# Patient Record
Sex: FEMALE | Race: WHITE | NOT HISPANIC OR LATINO | Employment: OTHER | URBAN - METROPOLITAN AREA
[De-identification: names, ages, dates, MRNs, and addresses within clinical notes are randomized per-mention and may not be internally consistent; named-entity substitution may affect disease eponyms.]

---

## 2024-02-16 ENCOUNTER — OFFICE VISIT (OUTPATIENT)
Dept: FAMILY MEDICINE | Facility: CLINIC | Age: 74
End: 2024-02-16
Payer: MEDICARE

## 2024-02-16 VITALS
HEART RATE: 71 BPM | SYSTOLIC BLOOD PRESSURE: 130 MMHG | TEMPERATURE: 98 F | OXYGEN SATURATION: 95 % | BODY MASS INDEX: 25.67 KG/M2 | HEIGHT: 63 IN | DIASTOLIC BLOOD PRESSURE: 78 MMHG | WEIGHT: 144.88 LBS

## 2024-02-16 DIAGNOSIS — M79.7 FIBROMYALGIA: ICD-10-CM

## 2024-02-16 DIAGNOSIS — Z11.59 NEED FOR HEPATITIS C SCREENING TEST: ICD-10-CM

## 2024-02-16 DIAGNOSIS — I15.2 HYPERTENSION ASSOCIATED WITH DIABETES: ICD-10-CM

## 2024-02-16 DIAGNOSIS — N32.81 OVERACTIVE BLADDER: ICD-10-CM

## 2024-02-16 DIAGNOSIS — N95.8 OTHER SPECIFIED MENOPAUSAL AND PERIMENOPAUSAL DISORDERS: ICD-10-CM

## 2024-02-16 DIAGNOSIS — Z79.899 HIGH RISK MEDICATION USE: ICD-10-CM

## 2024-02-16 DIAGNOSIS — E11.59 HYPERTENSION ASSOCIATED WITH DIABETES: ICD-10-CM

## 2024-02-16 DIAGNOSIS — Z12.11 COLON CANCER SCREENING: ICD-10-CM

## 2024-02-16 DIAGNOSIS — E11.9 TYPE 2 DIABETES MELLITUS WITHOUT COMPLICATION, WITHOUT LONG-TERM CURRENT USE OF INSULIN: Primary | ICD-10-CM

## 2024-02-16 DIAGNOSIS — Z12.31 ENCOUNTER FOR SCREENING MAMMOGRAM FOR MALIGNANT NEOPLASM OF BREAST: ICD-10-CM

## 2024-02-16 DIAGNOSIS — E11.42 DIABETIC POLYNEUROPATHY ASSOCIATED WITH TYPE 2 DIABETES MELLITUS: ICD-10-CM

## 2024-02-16 PROBLEM — D63.8 ANEMIA OF CHRONIC DISEASE: Status: ACTIVE | Noted: 2024-02-16

## 2024-02-16 PROBLEM — I10 HYPERTENSION: Status: ACTIVE | Noted: 2024-02-16

## 2024-02-16 PROBLEM — E11.40 DIABETIC NEUROPATHY: Status: ACTIVE | Noted: 2024-02-16

## 2024-02-16 PROBLEM — M19.90 ARTHRITIS: Status: ACTIVE | Noted: 2024-02-16

## 2024-02-16 PROBLEM — E11.39: Status: ACTIVE | Noted: 2024-02-16

## 2024-02-16 PROCEDURE — 1159F MED LIST DOCD IN RCRD: CPT | Mod: CPTII,S$GLB,, | Performed by: STUDENT IN AN ORGANIZED HEALTH CARE EDUCATION/TRAINING PROGRAM

## 2024-02-16 PROCEDURE — 3075F SYST BP GE 130 - 139MM HG: CPT | Mod: CPTII,S$GLB,, | Performed by: STUDENT IN AN ORGANIZED HEALTH CARE EDUCATION/TRAINING PROGRAM

## 2024-02-16 PROCEDURE — 1126F AMNT PAIN NOTED NONE PRSNT: CPT | Mod: CPTII,S$GLB,, | Performed by: STUDENT IN AN ORGANIZED HEALTH CARE EDUCATION/TRAINING PROGRAM

## 2024-02-16 PROCEDURE — 3078F DIAST BP <80 MM HG: CPT | Mod: CPTII,S$GLB,, | Performed by: STUDENT IN AN ORGANIZED HEALTH CARE EDUCATION/TRAINING PROGRAM

## 2024-02-16 PROCEDURE — 99204 OFFICE O/P NEW MOD 45 MIN: CPT | Mod: S$GLB,,, | Performed by: STUDENT IN AN ORGANIZED HEALTH CARE EDUCATION/TRAINING PROGRAM

## 2024-02-16 PROCEDURE — 3008F BODY MASS INDEX DOCD: CPT | Mod: CPTII,S$GLB,, | Performed by: STUDENT IN AN ORGANIZED HEALTH CARE EDUCATION/TRAINING PROGRAM

## 2024-02-16 PROCEDURE — 1160F RVW MEDS BY RX/DR IN RCRD: CPT | Mod: CPTII,S$GLB,, | Performed by: STUDENT IN AN ORGANIZED HEALTH CARE EDUCATION/TRAINING PROGRAM

## 2024-02-16 RX ORDER — CYCLOBENZAPRINE HCL 10 MG
10 TABLET ORAL NIGHTLY
Qty: 90 TABLET | Refills: 1 | Status: SHIPPED | OUTPATIENT
Start: 2024-02-16 | End: 2025-02-15

## 2024-02-16 RX ORDER — GABAPENTIN 300 MG/1
300 CAPSULE ORAL 2 TIMES DAILY
Qty: 180 CAPSULE | Refills: 1 | Status: SHIPPED | OUTPATIENT
Start: 2024-02-16 | End: 2025-02-15

## 2024-02-16 RX ORDER — DULAGLUTIDE 0.75 MG/.5ML
INJECTION, SOLUTION SUBCUTANEOUS
COMMUNITY
End: 2024-02-16

## 2024-02-16 RX ORDER — LOSARTAN POTASSIUM AND HYDROCHLOROTHIAZIDE 12.5; 5 MG/1; MG/1
TABLET ORAL
COMMUNITY
Start: 2024-01-10 | End: 2024-02-16 | Stop reason: SDUPTHER

## 2024-02-16 RX ORDER — AMITRIPTYLINE HYDROCHLORIDE 100 MG/1
TABLET ORAL
COMMUNITY
End: 2024-02-16

## 2024-02-16 RX ORDER — OXYBUTYNIN CHLORIDE 5 MG/1
5 TABLET ORAL 2 TIMES DAILY
Qty: 180 TABLET | Refills: 1 | Status: SHIPPED | OUTPATIENT
Start: 2024-02-16 | End: 2025-02-15

## 2024-02-16 RX ORDER — DULOXETIN HYDROCHLORIDE 30 MG/1
30 CAPSULE, DELAYED RELEASE ORAL DAILY
Qty: 90 CAPSULE | Refills: 1 | Status: SHIPPED | OUTPATIENT
Start: 2024-02-16 | End: 2025-02-15

## 2024-02-16 RX ORDER — LOSARTAN POTASSIUM AND HYDROCHLOROTHIAZIDE 12.5; 5 MG/1; MG/1
1 TABLET ORAL DAILY
Qty: 90 TABLET | Refills: 1 | Status: SHIPPED | OUTPATIENT
Start: 2024-02-16 | End: 2025-02-15

## 2024-02-16 RX ORDER — OXYBUTYNIN CHLORIDE 5 MG/1
5 TABLET ORAL 2 TIMES DAILY
COMMUNITY
End: 2024-02-16 | Stop reason: SDUPTHER

## 2024-02-16 RX ORDER — BACLOFEN 10 MG/1
10 TABLET ORAL 2 TIMES DAILY
COMMUNITY
End: 2024-02-16

## 2024-02-16 RX ORDER — GABAPENTIN 300 MG/1
300 CAPSULE ORAL 2 TIMES DAILY
COMMUNITY
End: 2024-02-16 | Stop reason: SDUPTHER

## 2024-02-16 RX ORDER — LANCETS 33 GAUGE
EACH MISCELLANEOUS
COMMUNITY
Start: 2023-08-28

## 2024-02-16 RX ORDER — CYCLOBENZAPRINE HCL 10 MG
10 TABLET ORAL NIGHTLY
COMMUNITY
Start: 2023-09-14 | End: 2024-02-16 | Stop reason: SDUPTHER

## 2024-02-16 NOTE — PATIENT INSTRUCTIONS
Cortez Moise,     If you are due for any health screening(s) below please notify me so we can arrange them to be ordered and scheduled. Most healthy patients at your age complete them, but you are free to accept or refuse.     If you can't do it, I'll definitely understand. If you can, I'd certainly appreciate it!    Tests to Keep You Healthy    Mammogram: DUE  Colon Cancer Screening: DUE      Schedule your breast cancer screening today     Breast cancer is the second most common cancer in women,  and the second leading cause of death from cancer. Mammograms can detect breast cancer early, which significantly increases the chances of curing the cancer.       Our records indicate that you may be overdue for breast cancer screening. Cancer screenings save lives, so schedule yours today to stay healthy.     If you recently had a mammogram performed outside of [facility], please let your Health care team know so that they can update your health record.        Its time for your colon cancer screening     Colorectal cancer is one of the leading causes of cancer death for men and women but it doesnt have to be. Screenings can prevent colorectal cancer or find it early enough to treat and cure the disease.     Our records indicate that you may be overdue for colon cancer screening. A colonoscopy or stool screening test can help identify patients at risk for developing colon cancer. Cancer screenings save lives, so schedule yours today to stay healthy.     A colonoscopy is the preferred test for detecting colon cancer. It is needed only once every 10 years if results are negative. While you are sedated, a flexible, lighted tube with a tiny camera is inserted into the rectum and advanced through the colon to look for cancers.     An alternative screening test that is used at home and returned to the lab may also be used. It detects hidden blood in bowel movements which could indicate cancer in the colon. If  results are positive, you will need a colonoscopy to determine if the blood is a sign of cancer. This type of follow up (diagnostic) colonoscopy usually requires additional copays as required by your insurance provider.     If you recently had your colon cancer screening performed outside of [facility], please let your Health care team know so that they can update your health record. Please contact your PCP if you have any questions.

## 2024-02-16 NOTE — PROGRESS NOTES
"  [facility]  [facility] - [place], [place]    Family Medicine Office Visit    Subjective     Patient ID: Suma Cruz is a 73 y.o. female who presents to clinic today to establish care.     Medical history, surgical history, medications, allergies, and social history were reviewed and updated.     Chief Complaint: Establish Care and Medication Refill    Medication Refill        Establish care  She presents today to establish care. We have reviewed medical history, surgical history, family history, medications, allergies, and social history. EMR was updated appropriately.     Type 2 diabetes mellitus  History of diabetes mellitus.  Was that she has currently not on a medication for this.  Reported that she was previously on metformin, but had diarrhea that led to dehydration in the hospitalization.  This was then discontinued.  She was then started on Trulicity it was unable to afford this medicine.  She reports that she does take her glucose at home and her glucose is usually in the 120s to 130s.  Reported that she largely manages her diabetes through diet.  She is agreeable to obtain lab work for monitoring.    She does report some diabetic neuropathy and currently takes gabapentin 300 mg twice daily.    Diabetes monitoring    No results found for: "HGBA1C"  No results found for: "GLUF", "MICROALBUR", "LDLCALC", "CREATININE"   No results found for this or any previous visit.     is not on statin therapy - Will plan to discuss at follow up  is on ACE/ARB therapy - losartan 50 mg daily     Hypertension  History of hypertension with current medication regimen of losartan-hydrochlorothiazide 50-12.5 mg daily. Most recent blood pressures shown below.  Did not report headaches, changes in vision, chest pain, and shortness of breath.  Blood pressure is currently at goal.  We will plan to continue current regimen.    BP Readings from Last 3 Encounters:   02/16/24 130/78      Overactive bladder  She " reports a history of overactive bladder that is currently well-controlled with the oxybutynin 5 mg twice daily.  Reports she has not seen a urologist in the past for this.  She is requesting refills of this medication.  No new or worsening dysuria, hematuria, urgency, or frequency.    Fibromyalgia  She reported history of fibromyalgia with current management of gabapentin 300 mg twice daily and Cymbalta 30 mg daily.  She is also on Flexeril and baclofen.  Recommended against the use of 2 muscle relaxers.  She was agreeable to start baclofen.  Discussed that as Flexeril is not recommended in geriatric patients, would recommend only taking this medication at night.    Vitals:    02/16/24 1646   BP: 130/78   Pulse: 71   Temp: 98.1 °F (36.7 °C)      Wt Readings from Last 3 Encounters:   02/16/24 1646 65.7 kg (144 lb 14.4 oz)      Review of Systems    Review of Systems otherwise negative unless specified above.        Objective     Physical Exam  Vitals reviewed.   Constitutional:       General: She is not in acute distress.     Appearance: Normal appearance.   HENT:      Head: Normocephalic and atraumatic.      Nose: Nose normal.      Mouth/Throat:      Mouth: Mucous membranes are moist.   Cardiovascular:      Rate and Rhythm: Normal rate and regular rhythm.      Heart sounds: No murmur heard.  Pulmonary:      Effort: Pulmonary effort is normal. No respiratory distress.      Breath sounds: Normal breath sounds.   Musculoskeletal:         General: Normal range of motion.   Skin:     General: Skin is warm and dry.   Neurological:      General: No focal deficit present.      Mental Status: She is alert and oriented to person, place, and time.   Psychiatric:         Mood and Affect: Mood normal.         Behavior: Behavior normal.            Assessment and Plan     Current Outpatient Medications   Medication Instructions    cyclobenzaprine (FLEXERIL) 10 mg, Oral, Nightly    DULoxetine (CYMBALTA) 30 mg, Oral, Daily     gabapentin (NEURONTIN) 300 mg, Oral, 2 times daily    losartan-hydrochlorothiazide 50-12.5 mg (HYZAAR) 50-12.5 mg per tablet 1 tablet, Oral, Daily    oxybutynin (DITROPAN) 5 mg, Oral, 2 times daily    TRUEPLUS LANCETS 33 gauge Misc Topical (Top)        1. Type 2 diabetes mellitus without complication, without long-term current use of insulin  -     Hemoglobin A1C; Future; Expected date: 02/16/2024  -     Lipid Panel; Future; Expected date: 02/16/2024  -     Microalbumin/Creatinine Ratio, Urine; Future; Expected date: 02/23/2024    2. Diabetic polyneuropathy associated with type 2 diabetes mellitus    3. Hypertension associated with diabetes  -     losartan-hydrochlorothiazide 50-12.5 mg (HYZAAR) 50-12.5 mg per tablet; Take 1 tablet by mouth once daily.  Dispense: 90 tablet; Refill: 1    4. Overactive bladder  -     oxybutynin (DITROPAN) 5 MG Tab; Take 1 tablet (5 mg total) by mouth 2 (two) times daily.  Dispense: 180 tablet; Refill: 1    5. Fibromyalgia  -     cyclobenzaprine (FLEXERIL) 10 MG tablet; Take 1 tablet (10 mg total) by mouth every evening.  Dispense: 90 tablet; Refill: 1  -     gabapentin (NEURONTIN) 300 MG capsule; Take 1 capsule (300 mg total) by mouth 2 (two) times daily.  Dispense: 180 capsule; Refill: 1  -     DULoxetine (CYMBALTA) 30 MG capsule; Take 1 capsule (30 mg total) by mouth once daily.  Dispense: 90 capsule; Refill: 1    6. High risk medication use  -     CBC auto differential; Future; Expected date: 02/16/2024  -     Comprehensive Metabolic Panel; Future; Expected date: 02/16/2024    7. Need for hepatitis C screening test  -     Hepatitis C antibody; Future; Expected date: 02/16/2024    8. Encounter for screening mammogram for malignant neoplasm of breast  -     Mammo Digital Screening Bilat w/ Michel; Future; Expected date: 02/16/2024    9. Other specified menopausal and perimenopausal disorders  -     DXA Bone Density Axial Skeleton 1 or more sites; Future; Expected date:  02/16/2024    10. Colon cancer screening  -     Cologuard Screening (Multitarget Stool DNA); Future; Expected date: 02/16/2024        We will obtain screening labs as above for her diabetes as above.  Continue chronic medication regimen for her hypertension, fibromyalgia, and overactive bladder.  We will also place order for mammogram, DEXA scan, and colon cancer screening.  We will otherwise plan to have her follow up in 1 month to discuss lab work.  We will also plan to discuss statin at that time.         Follow up in about 4 weeks (around 3/15/2024) for Follow up with Derrek.    Questions were invited and answered. No other acute concerns at this time. Will plan to follow up as above or sooner if needed.     Arlin Anglin, DO  02/16/2024 5:12 PM

## 2024-02-21 RX ORDER — DEXTROSE 4 G
1 TABLET,CHEWABLE ORAL DAILY
Qty: 100 EACH | Refills: 0 | Status: SHIPPED | OUTPATIENT
Start: 2024-02-21

## 2024-02-21 RX ORDER — BLOOD-GLUCOSE METER
1 EACH MISCELLANEOUS 2 TIMES DAILY
Qty: 100 EACH | Refills: 3 | Status: SHIPPED | OUTPATIENT
Start: 2024-02-21

## 2024-02-21 RX ORDER — BLOOD-GLUCOSE CONTROL, NORMAL
1 EACH MISCELLANEOUS 2 TIMES DAILY
Qty: 100 EACH | Refills: 3 | Status: SHIPPED | OUTPATIENT
Start: 2024-02-21

## 2024-02-21 RX ORDER — ISOPROPYL ALCOHOL 70 ML/100ML
1 SWAB TOPICAL 2 TIMES DAILY
Qty: 100 EACH | Refills: 3 | Status: SHIPPED | OUTPATIENT
Start: 2024-02-21

## 2024-02-27 ENCOUNTER — TELEPHONE (OUTPATIENT)
Dept: FAMILY MEDICINE | Facility: CLINIC | Age: 74
End: 2024-02-27
Payer: MEDICARE

## 2024-02-27 NOTE — TELEPHONE ENCOUNTER
Robert Anglin,  Please review message below from this patient States cannot afford the Trulicity and requesting to go back to a former medication for DM treatment.  Call placed to pt due to msg left, spoke w/pt states calling blood sugar are upper 140's and requesting to go back glyburide 2 mg QD. Pt states this Rx was order per Arely-NP with Shaina Chan.   Last office visit: 2/16/2024  Thank you.  Signed:  Abelardo Barr LPN    ----- Message from Aidee Champion sent at 2/27/2024  4:18 PM CST -----  Contact: pt  Type: Needs Medical Advice         Who Called: Pt  Best Call Back Number: [phone]  Additional Information: Requesting a call back regarding pt is needing the office to call her please. Pt said it's regarding a  Rx she is requesting.  Please Advise- Thank you

## 2024-03-06 NOTE — TELEPHONE ENCOUNTER
Left a detailed message letting pt know pcp's message. Advised pt to contact office with any further questions.

## 2024-03-22 DIAGNOSIS — E11.59 HYPERTENSION ASSOCIATED WITH DIABETES: ICD-10-CM

## 2024-03-22 DIAGNOSIS — I15.2 HYPERTENSION ASSOCIATED WITH DIABETES: ICD-10-CM

## 2024-04-11 LAB — NONINV COLON CA DNA+OCC BLD SCRN STL QL: NEGATIVE

## 2024-04-15 ENCOUNTER — TELEPHONE (OUTPATIENT)
Dept: FAMILY MEDICINE | Facility: CLINIC | Age: 74
End: 2024-04-15
Payer: MEDICARE

## 2024-04-15 NOTE — TELEPHONE ENCOUNTER
----- Message from Bjorn Vuong sent at 4/15/2024  3:46 PM CDT -----  Contact: self  Type: Sooner Appointment Request        Caller is requesting a sooner appointment. Caller declined first available appointment listed below. Caller will not accept being placed on the waitlist and is requesting a message be sent to doctor.        Name of Caller: Patient    Best Call Back Number: [phone]  Additional Information: Pt needs to get results about colon test pt also state she will do her last lab once her car gets fixed. Thanks

## 2024-04-24 ENCOUNTER — TELEPHONE (OUTPATIENT)
Dept: FAMILY MEDICINE | Facility: CLINIC | Age: 74
End: 2024-04-24
Payer: MEDICARE

## 2024-04-24 NOTE — TELEPHONE ENCOUNTER
----- Message from Arlin Anglin DO sent at 4/12/2024 12:01 PM CDT -----  Please contact the patient and let her know that her cologuard was negative.

## 2024-04-26 ENCOUNTER — LAB VISIT (OUTPATIENT)
Dept: LAB | Facility: CLINIC | Age: 74
End: 2024-04-26
Payer: MEDICARE

## 2024-04-26 ENCOUNTER — TELEPHONE (OUTPATIENT)
Dept: FAMILY MEDICINE | Facility: CLINIC | Age: 74
End: 2024-04-26
Payer: MEDICARE

## 2024-04-26 DIAGNOSIS — I15.2 HYPERTENSION ASSOCIATED WITH DIABETES: ICD-10-CM

## 2024-04-26 DIAGNOSIS — E11.59 HYPERTENSION ASSOCIATED WITH DIABETES: ICD-10-CM

## 2024-04-26 DIAGNOSIS — Z79.899 HIGH RISK MEDICATION USE: ICD-10-CM

## 2024-04-26 DIAGNOSIS — Z11.59 NEED FOR HEPATITIS C SCREENING TEST: ICD-10-CM

## 2024-04-26 LAB
ALBUMIN SERPL BCP-MCNC: 3.6 G/DL (ref 3.5–5.2)
ALP SERPL-CCNC: 105 U/L (ref 55–135)
ALT SERPL W/O P-5'-P-CCNC: 14 U/L (ref 10–44)
ANION GAP SERPL CALC-SCNC: 10 MMOL/L (ref 8–16)
AST SERPL-CCNC: 25 U/L (ref 10–40)
BASOPHILS # BLD AUTO: 0.04 K/UL (ref 0–0.2)
BASOPHILS NFR BLD: 1 % (ref 0–1.9)
BILIRUB SERPL-MCNC: 1.2 MG/DL (ref 0.1–1)
BUN SERPL-MCNC: 19 MG/DL (ref 8–23)
CALCIUM SERPL-MCNC: 9.1 MG/DL (ref 8.7–10.5)
CHLORIDE SERPL-SCNC: 104 MMOL/L (ref 95–110)
CO2 SERPL-SCNC: 26 MMOL/L (ref 23–29)
CREAT SERPL-MCNC: 1 MG/DL (ref 0.5–1.4)
DIFFERENTIAL METHOD BLD: ABNORMAL
EOSINOPHIL # BLD AUTO: 0.2 K/UL (ref 0–0.5)
EOSINOPHIL NFR BLD: 3.8 % (ref 0–8)
ERYTHROCYTE [DISTWIDTH] IN BLOOD BY AUTOMATED COUNT: 14.5 % (ref 11.5–14.5)
EST. GFR  (NO RACE VARIABLE): 59.9 ML/MIN/1.73 M^2
GLUCOSE SERPL-MCNC: 111 MG/DL (ref 70–110)
HCT VFR BLD AUTO: 39.8 % (ref 37–48.5)
HCV AB SERPL QL IA: NORMAL
HGB BLD-MCNC: 12.7 G/DL (ref 12–16)
IMM GRANULOCYTES # BLD AUTO: 0.01 K/UL (ref 0–0.04)
IMM GRANULOCYTES NFR BLD AUTO: 0.2 % (ref 0–0.5)
LYMPHOCYTES # BLD AUTO: 1.2 K/UL (ref 1–4.8)
LYMPHOCYTES NFR BLD: 28.4 % (ref 18–48)
MCH RBC QN AUTO: 29.1 PG (ref 27–31)
MCHC RBC AUTO-ENTMCNC: 31.9 G/DL (ref 32–36)
MCV RBC AUTO: 91 FL (ref 82–98)
MONOCYTES # BLD AUTO: 0.3 K/UL (ref 0.3–1)
MONOCYTES NFR BLD: 7.2 % (ref 4–15)
NEUTROPHILS # BLD AUTO: 2.5 K/UL (ref 1.8–7.7)
NEUTROPHILS NFR BLD: 59.4 % (ref 38–73)
NRBC BLD-RTO: 0 /100 WBC
PLATELET # BLD AUTO: 101 K/UL (ref 150–450)
PMV BLD AUTO: 12.2 FL (ref 9.2–12.9)
POTASSIUM SERPL-SCNC: 4.3 MMOL/L (ref 3.5–5.1)
PROT SERPL-MCNC: 6.6 G/DL (ref 6–8.4)
RBC # BLD AUTO: 4.36 M/UL (ref 4–5.4)
SODIUM SERPL-SCNC: 140 MMOL/L (ref 136–145)
WBC # BLD AUTO: 4.19 K/UL (ref 3.9–12.7)

## 2024-04-26 PROCEDURE — 86803 HEPATITIS C AB TEST: CPT | Performed by: STUDENT IN AN ORGANIZED HEALTH CARE EDUCATION/TRAINING PROGRAM

## 2024-04-26 PROCEDURE — 36415 COLL VENOUS BLD VENIPUNCTURE: CPT | Mod: ,,, | Performed by: STUDENT IN AN ORGANIZED HEALTH CARE EDUCATION/TRAINING PROGRAM

## 2024-04-26 PROCEDURE — 85025 COMPLETE CBC W/AUTO DIFF WBC: CPT | Performed by: STUDENT IN AN ORGANIZED HEALTH CARE EDUCATION/TRAINING PROGRAM

## 2024-04-26 PROCEDURE — 80053 COMPREHEN METABOLIC PANEL: CPT | Performed by: STUDENT IN AN ORGANIZED HEALTH CARE EDUCATION/TRAINING PROGRAM

## 2024-04-26 NOTE — TELEPHONE ENCOUNTER
Contacted patient and let her know that some of her lab work has resulted.  CBC with low platelets at 101.  Otherwise, no acute findings.  CMP without electrolyte, kidney, or liver dysfunction.

## 2024-04-26 NOTE — TELEPHONE ENCOUNTER
----- Message from Arlin Anglin DO sent at 4/26/2024  3:36 PM CDT -----  Please contact the patient and let her know that some of her lab work has resulted.  CBC with low platelets at 101.  Otherwise, no acute findings.  CMP without electrolyte, kidney, or liver dysfunction.  Please schedule her an appointment so we can go over next steps.

## 2024-05-02 ENCOUNTER — TELEPHONE (OUTPATIENT)
Dept: FAMILY MEDICINE | Facility: CLINIC | Age: 74
End: 2024-05-02
Payer: MEDICARE

## 2024-08-05 RX ORDER — BACLOFEN 10 MG/1
TABLET ORAL
Qty: 90 TABLET | Refills: 0 | OUTPATIENT
Start: 2024-08-05

## 2024-08-19 ENCOUNTER — OFFICE VISIT (OUTPATIENT)
Dept: FAMILY MEDICINE | Facility: CLINIC | Age: 74
End: 2024-08-19
Payer: MEDICARE

## 2024-08-19 ENCOUNTER — LAB VISIT (OUTPATIENT)
Dept: LAB | Facility: CLINIC | Age: 74
End: 2024-08-19
Payer: MEDICARE

## 2024-08-19 VITALS
SYSTOLIC BLOOD PRESSURE: 160 MMHG | HEIGHT: 63 IN | OXYGEN SATURATION: 97 % | HEART RATE: 89 BPM | WEIGHT: 153.19 LBS | DIASTOLIC BLOOD PRESSURE: 60 MMHG | BODY MASS INDEX: 27.14 KG/M2

## 2024-08-19 DIAGNOSIS — E11.9 TYPE 2 DIABETES MELLITUS WITHOUT COMPLICATION, WITHOUT LONG-TERM CURRENT USE OF INSULIN: ICD-10-CM

## 2024-08-19 DIAGNOSIS — I15.2 HYPERTENSION ASSOCIATED WITH DIABETES: ICD-10-CM

## 2024-08-19 DIAGNOSIS — R29.6 FREQUENT FALLS: ICD-10-CM

## 2024-08-19 DIAGNOSIS — Z23 NEED FOR PNEUMOCOCCAL 20-VALENT CONJUGATE VACCINATION: ICD-10-CM

## 2024-08-19 DIAGNOSIS — N95.8 OTHER SPECIFIED MENOPAUSAL AND PERIMENOPAUSAL DISORDERS: ICD-10-CM

## 2024-08-19 DIAGNOSIS — M79.7 FIBROMYALGIA: ICD-10-CM

## 2024-08-19 DIAGNOSIS — N32.81 OVERACTIVE BLADDER: ICD-10-CM

## 2024-08-19 DIAGNOSIS — R53.81 PHYSICAL DECONDITIONING: Primary | ICD-10-CM

## 2024-08-19 DIAGNOSIS — E11.59 HYPERTENSION ASSOCIATED WITH DIABETES: ICD-10-CM

## 2024-08-19 DIAGNOSIS — Z12.31 ENCOUNTER FOR SCREENING MAMMOGRAM FOR MALIGNANT NEOPLASM OF BREAST: ICD-10-CM

## 2024-08-19 PROCEDURE — 83036 HEMOGLOBIN GLYCOSYLATED A1C: CPT | Performed by: STUDENT IN AN ORGANIZED HEALTH CARE EDUCATION/TRAINING PROGRAM

## 2024-08-19 PROCEDURE — 1160F RVW MEDS BY RX/DR IN RCRD: CPT | Mod: CPTII,S$GLB,, | Performed by: STUDENT IN AN ORGANIZED HEALTH CARE EDUCATION/TRAINING PROGRAM

## 2024-08-19 PROCEDURE — 80069 RENAL FUNCTION PANEL: CPT | Performed by: STUDENT IN AN ORGANIZED HEALTH CARE EDUCATION/TRAINING PROGRAM

## 2024-08-19 PROCEDURE — 3077F SYST BP >= 140 MM HG: CPT | Mod: CPTII,S$GLB,, | Performed by: STUDENT IN AN ORGANIZED HEALTH CARE EDUCATION/TRAINING PROGRAM

## 2024-08-19 PROCEDURE — 3078F DIAST BP <80 MM HG: CPT | Mod: CPTII,S$GLB,, | Performed by: STUDENT IN AN ORGANIZED HEALTH CARE EDUCATION/TRAINING PROGRAM

## 2024-08-19 PROCEDURE — 1126F AMNT PAIN NOTED NONE PRSNT: CPT | Mod: CPTII,S$GLB,, | Performed by: STUDENT IN AN ORGANIZED HEALTH CARE EDUCATION/TRAINING PROGRAM

## 2024-08-19 PROCEDURE — 80061 LIPID PANEL: CPT | Performed by: STUDENT IN AN ORGANIZED HEALTH CARE EDUCATION/TRAINING PROGRAM

## 2024-08-19 PROCEDURE — 3008F BODY MASS INDEX DOCD: CPT | Mod: CPTII,S$GLB,, | Performed by: STUDENT IN AN ORGANIZED HEALTH CARE EDUCATION/TRAINING PROGRAM

## 2024-08-19 PROCEDURE — G0009 ADMIN PNEUMOCOCCAL VACCINE: HCPCS | Mod: S$GLB,,, | Performed by: STUDENT IN AN ORGANIZED HEALTH CARE EDUCATION/TRAINING PROGRAM

## 2024-08-19 PROCEDURE — 90677 PCV20 VACCINE IM: CPT | Mod: S$GLB,,, | Performed by: STUDENT IN AN ORGANIZED HEALTH CARE EDUCATION/TRAINING PROGRAM

## 2024-08-19 PROCEDURE — G2211 COMPLEX E/M VISIT ADD ON: HCPCS | Mod: S$GLB,,, | Performed by: STUDENT IN AN ORGANIZED HEALTH CARE EDUCATION/TRAINING PROGRAM

## 2024-08-19 PROCEDURE — 99214 OFFICE O/P EST MOD 30 MIN: CPT | Mod: S$GLB,,, | Performed by: STUDENT IN AN ORGANIZED HEALTH CARE EDUCATION/TRAINING PROGRAM

## 2024-08-19 PROCEDURE — 1159F MED LIST DOCD IN RCRD: CPT | Mod: CPTII,S$GLB,, | Performed by: STUDENT IN AN ORGANIZED HEALTH CARE EDUCATION/TRAINING PROGRAM

## 2024-08-19 RX ORDER — OXYBUTYNIN CHLORIDE 5 MG/1
5 TABLET ORAL 2 TIMES DAILY
Qty: 180 TABLET | Refills: 1 | Status: SHIPPED | OUTPATIENT
Start: 2024-08-19 | End: 2025-08-19

## 2024-08-19 RX ORDER — DULOXETIN HYDROCHLORIDE 30 MG/1
30 CAPSULE, DELAYED RELEASE ORAL DAILY
Qty: 90 CAPSULE | Refills: 1 | Status: SHIPPED | OUTPATIENT
Start: 2024-08-19 | End: 2025-08-19

## 2024-08-19 RX ORDER — GABAPENTIN 300 MG/1
300 CAPSULE ORAL 2 TIMES DAILY
Qty: 180 CAPSULE | Refills: 1 | Status: SHIPPED | OUTPATIENT
Start: 2024-08-19 | End: 2025-08-19

## 2024-08-19 RX ORDER — LOSARTAN POTASSIUM AND HYDROCHLOROTHIAZIDE 12.5; 5 MG/1; MG/1
1 TABLET ORAL DAILY
Qty: 90 TABLET | Refills: 1 | Status: SHIPPED | OUTPATIENT
Start: 2024-08-19 | End: 2025-08-19

## 2024-08-19 RX ORDER — CYCLOBENZAPRINE HCL 10 MG
10 TABLET ORAL NIGHTLY
Qty: 90 TABLET | Refills: 1 | Status: SHIPPED | OUTPATIENT
Start: 2024-08-19 | End: 2025-08-19

## 2024-08-19 NOTE — PROGRESS NOTES
"  [facility]  [facility] - [place], [place]    Family Medicine Office Visit    Subjective     Patient ID: Suma Cruz is a 73 y.o. female who presents to clinic today to follow up.     Medical history, surgical history, medications, allergies, and social history were reviewed and updated.     Chief Complaint: Follow-up    HPI    Frequent falls  Was on the commode trying to have BM and bared down. Lost consciousness and ended up on the floor. She had a head lac and broke her collarbone. Woke up this morning and was trying to take a shower. She hit the back of her head when she got tangled in the shower curtain and fell. Taking ibuprofen/tylenol for the fracture.     Reported that she was worried about her clumsiness increasing her risk for falls.  We discussed referral to physical therapy for further evaluation, which she was agreeable to.    Type 2 diabetes mellitus  She has a history of type 2 diabetes in his currently diet controlled.  No medications currently on file.  We will complete A1c and lipid panel today.    Diabetes monitoring    No results found for: "HGBA1C"  Lab Results   Component Value Date    CREATININE 1.0 04/26/2024      No results found for this or any previous visit.     is not on statin therapy - planning to hold this medication due to age unless indicated otherwise  is on ACE/ARB therapy - losartan 50 mg daily     Hypertension  History of hypertension with current medication regimen of losartan-hydrochlorothiazide 50-12.5 mg daily. Most recent blood pressures shown below. denies headaches, changes in vision, chest pain, and shortness of breath.  Blood pressure above goal today.  She reported that she forgot to take her medicine as she felt this morning.  Agreeable to return in a few weeks while on medication.    BP Readings from Last 3 Encounters:   08/19/24 (!) 160/60   02/16/24 130/78      Fibromyalgia  She has a history of fibromyalgia that is currently " well-controlled with Flexeril 10 mg nightly and Cymbalta 30 mg daily.  No new or worsening symptoms reported.  Requesting refills of these medications today.    Overactive bladder  She has a history of overactive bladder with current medication regimen of oxybutynin 5 mg twice daily.  Reported that it was working well for her in controlling her symptoms.  Requesting refills of his medication today.    Vitals:    08/19/24 1419   BP: (!) 160/60   Pulse: 89      Wt Readings from Last 3 Encounters:   08/19/24 1419 69.5 kg (153 lb 3.2 oz)   02/16/24 1646 65.7 kg (144 lb 14.4 oz)      Review of Systems    Review of Systems otherwise negative unless specified above.        Objective     Physical Exam  Vitals reviewed.   Constitutional:       General: She is not in acute distress.     Appearance: Normal appearance.   HENT:      Head: Normocephalic and atraumatic.      Nose: Nose normal.      Mouth/Throat:      Mouth: Mucous membranes are moist.   Cardiovascular:      Rate and Rhythm: Normal rate and regular rhythm.      Heart sounds: No murmur heard.  Pulmonary:      Effort: Pulmonary effort is normal. No respiratory distress.      Breath sounds: Normal breath sounds.   Musculoskeletal:         General: Normal range of motion.   Skin:     General: Skin is warm and dry.      Findings: Bruising present.      Comments: Healing had laceration   Neurological:      General: No focal deficit present.      Mental Status: She is alert and oriented to person, place, and time.   Psychiatric:         Mood and Affect: Mood normal.         Behavior: Behavior normal.            Assessment and Plan     Current Outpatient Medications   Medication Instructions    alcohol swabs (BD ALCOHOL SWABS) PadM 1 each, Topical (Top), 2 times daily    blood glucose control, low (TRUE METRIX LEVEL 1) Soln 1 each, Subcutaneous, 2 times daily    blood sugar diagnostic (TRUE METRIX GLUCOSE TEST STRIP) Strp 1 each, Subcutaneous, 2 times daily     blood-glucose meter (TRUE METRIX AIR GLUCOSE METER) Misc 1 each, Subcutaneous, Daily    cyclobenzaprine (FLEXERIL) 10 mg, Oral, Nightly    DULoxetine (CYMBALTA) 30 mg, Oral, Daily    gabapentin (NEURONTIN) 300 mg, Oral, 2 times daily    lancets (TRUEPLUS LANCETS) 30 gauge Misc 1 lancet , Subcutaneous, 2 times daily    losartan-hydrochlorothiazide 50-12.5 mg (HYZAAR) 50-12.5 mg per tablet 1 tablet, Oral, Daily    oxybutynin (DITROPAN) 5 mg, Oral, 2 times daily    TRUEPLUS LANCETS 33 gauge Misc Topical (Top)        1. Physical deconditioning  -     Ambulatory referral/consult to Physical/Occupational Therapy; Future; Expected date: 08/26/2024    2. Frequent falls    3. Type 2 diabetes mellitus without complication, without long-term current use of insulin  -     Lipid Panel; Future  -     Renal Function Panel; Future  -     Hemoglobin A1C; Future    4. Hypertension associated with diabetes  -     losartan-hydrochlorothiazide 50-12.5 mg (HYZAAR) 50-12.5 mg per tablet; Take 1 tablet by mouth once daily.  Dispense: 90 tablet; Refill: 1    5. Fibromyalgia  -     gabapentin (NEURONTIN) 300 MG capsule; Take 1 capsule (300 mg total) by mouth 2 (two) times daily.  Dispense: 180 capsule; Refill: 1  -     DULoxetine (CYMBALTA) 30 MG capsule; Take 1 capsule (30 mg total) by mouth once daily.  Dispense: 90 capsule; Refill: 1  -     cyclobenzaprine (FLEXERIL) 10 MG tablet; Take 1 tablet (10 mg total) by mouth every evening.  Dispense: 90 tablet; Refill: 1    6. Overactive bladder  -     oxybutynin (DITROPAN) 5 MG Tab; Take 1 tablet (5 mg total) by mouth 2 (two) times daily.  Dispense: 180 tablet; Refill: 1    7. Need for pneumococcal 20-valent conjugate vaccination  -     pneumoc 20-calvin conj-dip cr(PF) (PREVNAR-20 (PF)) injection Syrg 0.5 mL    8. Encounter for screening mammogram for malignant neoplasm of breast  -     Mammo Digital Screening Bilat w/ Michel; Future; Expected date: 08/19/2024    9. Other specified menopausal and  perimenopausal disorders  -     DXA Bone Density Axial Skeleton 1 or more sites; Future; Expected date: 08/19/2024        Refilling chronic medications as above.  Obtaining lab work as well.  Completed pneumonia vaccine.  Ordering mammogram and DEXA scan to be completed at [facility].  We will otherwise plan to have her follow up in 4 weeks to repeat her blood pressure.    Visit today included increased complexity associated with the care of the episodic problem diabetes, hypertension addressed and managing the longitudinal care of the patient due to the serious and/or complex managed problem(s) diabetes, hypertension.         Follow up in about 4 weeks (around 9/16/2024) for Follow up with Derrek.    Questions were invited and answered. No other acute concerns at this time. Will plan to follow up as above or sooner if needed.     Arlin Anglin, DO  08/19/2024 2:45 PM       This dictation has been generated using Modal Fluency Dictation. Some phonetic errors may occur. Please contact author for clarification if needed.

## 2024-08-19 NOTE — PATIENT INSTRUCTIONS
Cortez Moise,     If you are due for any health screening(s) below please notify me so we can arrange them to be ordered and scheduled. Most healthy patients at your age complete them, but you are free to accept or refuse.     If you can't do it, I'll definitely understand. If you can, I'd certainly appreciate it!    Tests to Keep You Healthy    Mammogram: SCHEDULED  Eye Exam: DUE  Colon Cancer Screening: Met on 4/1/2024  Last Blood Pressure <= 139/89 (8/19/2024): NO  Last HbA1c < 8 (The patient doesn't have any registry metric data available): NO      Schedule your breast cancer screening today     Breast cancer is the second most common cancer in women,  and the second leading cause of death from cancer. Mammograms can detect breast cancer early, which significantly increases the chances of curing the cancer.       Our records indicate that you may be overdue for breast cancer screening. Cancer screenings save lives, so schedule yours today to stay healthy.     If you recently had a mammogram performed outside of [facility], please let your Health care team know so that they can update your health record.        Lets manage your high blood pressure     Your blood pressure was above 140/90 today during your visit. We recommend that you schedule a nurse visit in two weeks to check your blood pressure and discuss ways to support your health goals.     You can also manage your health and record your blood pressure from the comfort of home by keeping a daily blood pressure log. These results are shared with and reviewed by your provider. Please print this form (Daily Blood Pressure Log) to assist you in keeping track of your blood pressure at home.     Schedule your nurse visit in two weeks to learn more about how to track and manage high blood pressure.    Daily Blood Pressure Log    Name:__________________________________                  Date of Birth:_________    Average Blood Pressure:   __________      Date: Time  (a.m.) Blood  Pressure: Pulse  Rate: Time  (p.m.) Blood  Pressure : Pulse  Rate:   Sample 8:37 127/83 84                                                                                                                                                                                   Your diabetic retinal eye exam is due     Diabetes is the #1 cause of blindness in the [place] - early detection before signs or symptoms develop can prevent debilitating blindness.     Our records indicate that you may be overdue for your annual diabetic eye exam. Eye screening can help identify patients at risk for developing vision loss which is common in diabetes. This simple screening is an important step to keeping you healthy and preventing complications from diabetes.     This recommended diabetic eye exam should take place once per year and can prevent and treat diabetes complications in the eye before developing symptoms. This can be done with a special camera is used to take photographs of the back of your eye without having to dilate them, or you can see an eye doctor for a full dilated exam.     If you recently had your yearly diabetic eye exam performed outside of [facility], please let your Health care team know so that they can update your health record.

## 2024-08-20 LAB
ALBUMIN SERPL BCP-MCNC: 3.2 G/DL (ref 3.5–5.2)
ANION GAP SERPL CALC-SCNC: 10 MMOL/L (ref 8–16)
BUN SERPL-MCNC: 26 MG/DL (ref 8–23)
CALCIUM SERPL-MCNC: 8.9 MG/DL (ref 8.7–10.5)
CHLORIDE SERPL-SCNC: 103 MMOL/L (ref 95–110)
CHOLEST SERPL-MCNC: 128 MG/DL (ref 120–199)
CHOLEST/HDLC SERPL: 2.5 {RATIO} (ref 2–5)
CO2 SERPL-SCNC: 23 MMOL/L (ref 23–29)
CREAT SERPL-MCNC: 1.1 MG/DL (ref 0.5–1.4)
EST. GFR  (NO RACE VARIABLE): 53.4 ML/MIN/1.73 M^2
ESTIMATED AVG GLUCOSE: 111 MG/DL (ref 68–131)
GLUCOSE SERPL-MCNC: 106 MG/DL (ref 70–110)
HBA1C MFR BLD: 5.5 % (ref 4–5.6)
HDLC SERPL-MCNC: 51 MG/DL (ref 40–75)
HDLC SERPL: 39.8 % (ref 20–50)
LDLC SERPL CALC-MCNC: 64.4 MG/DL (ref 63–159)
NONHDLC SERPL-MCNC: 77 MG/DL
PHOSPHATE SERPL-MCNC: 3.3 MG/DL (ref 2.7–4.5)
POTASSIUM SERPL-SCNC: 4.2 MMOL/L (ref 3.5–5.1)
SODIUM SERPL-SCNC: 136 MMOL/L (ref 136–145)
TRIGL SERPL-MCNC: 63 MG/DL (ref 30–150)

## 2024-08-21 ENCOUNTER — TELEPHONE (OUTPATIENT)
Dept: FAMILY MEDICINE | Facility: CLINIC | Age: 74
End: 2024-08-21
Payer: MEDICARE

## 2024-08-21 NOTE — TELEPHONE ENCOUNTER
Patient and patient's niece Marisa informed of information below.  They thank us for the call and have no further questions at this time.

## 2024-08-21 NOTE — TELEPHONE ENCOUNTER
----- Message from Arlin Anglin DO sent at 8/20/2024  2:24 PM CDT -----  Please contact the patient and let her know that her lab work has resulted.  A1c within normal limits.  Renal function panel without electrolyte dysfunction.  Kidney function stable.  Cholesterol panel without acute findings.  Continue current medication regimen.

## 2024-09-06 DIAGNOSIS — E11.9 TYPE 2 DIABETES MELLITUS WITHOUT COMPLICATION: ICD-10-CM

## 2024-09-17 ENCOUNTER — TELEPHONE (OUTPATIENT)
Dept: FAMILY MEDICINE | Facility: CLINIC | Age: 74
End: 2024-09-17
Payer: MEDICARE

## 2024-09-17 NOTE — TELEPHONE ENCOUNTER
Robert Worthington,  Please review this message below from this patient's niece concerning patient mental status has been changing since last appointment.  Spoke w/pt's niece and requesting a referral to psych due to c/o ADL's has changed and will not take medications.   Requesting a referral to psych.  Niece is requesting a call back to speak with on issues occurring with patient.  Last office visit: 8/19/2024  Please review and advise.  Thank you.  Signed:  Abelardo Barr LPN    ----- Message from Yany Leblanc sent at 9/17/2024  1:44 PM CDT -----  Regarding: Call / Referral  Type:  Needs Medical Advice    Who Called: Pt Niece    Would the patient rather a call back or a response via [facility]? Call back    Best Call Back Number: [phone]    Additional Information: Niece is requesting a referral for a psych  Thank you

## 2024-09-18 NOTE — TELEPHONE ENCOUNTER
LVM for patient's niece to call back and let us know if she would like patient seen in office or in the ER.

## 2024-10-02 ENCOUNTER — TELEPHONE (OUTPATIENT)
Dept: FAMILY MEDICINE | Facility: CLINIC | Age: 74
End: 2024-10-02
Payer: MEDICARE

## 2024-10-02 NOTE — TELEPHONE ENCOUNTER
Robert Anglin's Staff,  Please review this message below from Bibi with [facility] concerning unable to reach this patient.  Please review.  Thank you.  Signed:  Abelardo Barr LPN  ----- Message from Dariusz sent at 10/2/2024  2:56 PM CDT -----  Regarding: return call  Contact: Bibi with [facility]  Type:  Patient Returning Call    Who Called:Bibi with [facility]  Who Left Message for Patient:  Does the patient know what this is regarding?:referral received and unable to contact patient/ now order has   Would the patient rather a call back or a response via [facility]?   Best Call Back Number:  Additional Information: Keyona Resendiz  2005 also unable to contact this patient also/ all order has

## 2024-12-11 ENCOUNTER — TELEPHONE (OUTPATIENT)
Dept: FAMILY MEDICINE | Facility: CLINIC | Age: 74
End: 2024-12-11
Payer: MEDICARE

## 2025-02-28 DIAGNOSIS — E11.9 TYPE 2 DIABETES MELLITUS WITHOUT COMPLICATION: ICD-10-CM

## 2025-03-10 ENCOUNTER — TELEPHONE (OUTPATIENT)
Dept: FAMILY MEDICINE | Facility: CLINIC | Age: 75
End: 2025-03-10

## 2025-03-10 NOTE — LETTER
March 10, 2025    Suma Cruz  [address] [place] [address] [place] [address]             [facility]  [address] [facility] [place] [address]  Phone: [phone] Dear Mrs. Suma Cruz:    We are sorry that you missed your appointment with Dr. Arlin Anglin on 3/10/2025. Your health and follow-up medical care are important to us. Please call our office as soon as possible so that we may reschedule your appointment. If you have already rescheduled your appointment, please disregard this letter.    Sincerely,        Arlin Anglin, DO

## 2025-03-28 ENCOUNTER — PATIENT OUTREACH (OUTPATIENT)
Dept: ADMINISTRATIVE | Facility: HOSPITAL | Age: 75
End: 2025-03-28
Payer: MEDICARE

## 2025-03-28 NOTE — PROGRESS NOTES
Spoke with relative regarding scheduling an appointment.  Was asked to call back later today.  Reminder set.

## 2025-05-16 ENCOUNTER — PATIENT OUTREACH (OUTPATIENT)
Dept: ADMINISTRATIVE | Facility: HOSPITAL | Age: 75
End: 2025-05-16
Payer: MEDICARE

## 2025-05-29 ENCOUNTER — PATIENT OUTREACH (OUTPATIENT)
Dept: ADMINISTRATIVE | Facility: HOSPITAL | Age: 75
End: 2025-05-29
Payer: MEDICARE

## 2025-05-29 NOTE — PROGRESS NOTES
[facility] Review & Patient Outreach Details      Contacted patient by phone:    Unable to reach/ Follow up date noted  Patient scheduled for ECA with Dr Morelos on 06.04.25

## 2025-06-04 ENCOUNTER — OFFICE VISIT (OUTPATIENT)
Dept: FAMILY MEDICINE | Facility: CLINIC | Age: 75
End: 2025-06-04
Payer: MEDICARE

## 2025-06-04 VITALS
OXYGEN SATURATION: 97 % | BODY MASS INDEX: 26.37 KG/M2 | HEIGHT: 63 IN | HEART RATE: 104 BPM | WEIGHT: 148.81 LBS | DIASTOLIC BLOOD PRESSURE: 82 MMHG | SYSTOLIC BLOOD PRESSURE: 118 MMHG | RESPIRATION RATE: 20 BRPM

## 2025-06-04 DIAGNOSIS — E11.9 TYPE 2 DIABETES MELLITUS WITHOUT COMPLICATION, WITHOUT LONG-TERM CURRENT USE OF INSULIN: Primary | ICD-10-CM

## 2025-06-04 DIAGNOSIS — Z12.31 ENCOUNTER FOR SCREENING MAMMOGRAM FOR MALIGNANT NEOPLASM OF BREAST: ICD-10-CM

## 2025-06-04 DIAGNOSIS — I15.2 HYPERTENSION ASSOCIATED WITH DIABETES: ICD-10-CM

## 2025-06-04 DIAGNOSIS — M79.7 FIBROMYALGIA: ICD-10-CM

## 2025-06-04 DIAGNOSIS — Z78.0 ASYMPTOMATIC MENOPAUSAL STATE: ICD-10-CM

## 2025-06-04 DIAGNOSIS — N32.81 OVERACTIVE BLADDER: ICD-10-CM

## 2025-06-04 DIAGNOSIS — E11.59 HYPERTENSION ASSOCIATED WITH DIABETES: ICD-10-CM

## 2025-06-04 DIAGNOSIS — E55.9 VITAMIN D DEFICIENCY DISEASE: ICD-10-CM

## 2025-06-04 DIAGNOSIS — N18.31 CHRONIC KIDNEY DISEASE, STAGE 3A: ICD-10-CM

## 2025-06-04 PROCEDURE — 3079F DIAST BP 80-89 MM HG: CPT | Mod: CPTII,S$GLB,, | Performed by: STUDENT IN AN ORGANIZED HEALTH CARE EDUCATION/TRAINING PROGRAM

## 2025-06-04 PROCEDURE — 99999 PR PBB SHADOW E&M-EST. PATIENT-LVL IV: CPT | Mod: PBBFAC,,, | Performed by: STUDENT IN AN ORGANIZED HEALTH CARE EDUCATION/TRAINING PROGRAM

## 2025-06-04 PROCEDURE — 99214 OFFICE O/P EST MOD 30 MIN: CPT | Mod: S$GLB,,, | Performed by: STUDENT IN AN ORGANIZED HEALTH CARE EDUCATION/TRAINING PROGRAM

## 2025-06-04 PROCEDURE — 3008F BODY MASS INDEX DOCD: CPT | Mod: CPTII,S$GLB,, | Performed by: STUDENT IN AN ORGANIZED HEALTH CARE EDUCATION/TRAINING PROGRAM

## 2025-06-04 PROCEDURE — 3288F FALL RISK ASSESSMENT DOCD: CPT | Mod: CPTII,S$GLB,, | Performed by: STUDENT IN AN ORGANIZED HEALTH CARE EDUCATION/TRAINING PROGRAM

## 2025-06-04 PROCEDURE — 1159F MED LIST DOCD IN RCRD: CPT | Mod: CPTII,S$GLB,, | Performed by: STUDENT IN AN ORGANIZED HEALTH CARE EDUCATION/TRAINING PROGRAM

## 2025-06-04 PROCEDURE — 1100F PTFALLS ASSESS-DOCD GE2>/YR: CPT | Mod: CPTII,S$GLB,, | Performed by: STUDENT IN AN ORGANIZED HEALTH CARE EDUCATION/TRAINING PROGRAM

## 2025-06-04 PROCEDURE — 3074F SYST BP LT 130 MM HG: CPT | Mod: CPTII,S$GLB,, | Performed by: STUDENT IN AN ORGANIZED HEALTH CARE EDUCATION/TRAINING PROGRAM

## 2025-06-04 RX ORDER — GABAPENTIN 300 MG/1
300 CAPSULE ORAL 2 TIMES DAILY
Qty: 180 CAPSULE | Refills: 1 | Status: SHIPPED | OUTPATIENT
Start: 2025-06-04 | End: 2026-06-04

## 2025-06-04 RX ORDER — BLOOD-GLUCOSE SENSOR
1 EACH MISCELLANEOUS
Qty: 1 EACH | Refills: 3 | Status: SHIPPED | OUTPATIENT
Start: 2025-06-04 | End: 2026-06-04

## 2025-06-04 RX ORDER — LOSARTAN POTASSIUM AND HYDROCHLOROTHIAZIDE 12.5; 5 MG/1; MG/1
1 TABLET ORAL DAILY
Qty: 90 TABLET | Refills: 3 | Status: SHIPPED | OUTPATIENT
Start: 2025-06-04 | End: 2026-06-04

## 2025-06-04 RX ORDER — CYCLOBENZAPRINE HCL 10 MG
10 TABLET ORAL NIGHTLY
Qty: 90 TABLET | Refills: 3 | Status: SHIPPED | OUTPATIENT
Start: 2025-06-04 | End: 2026-06-04

## 2025-06-04 RX ORDER — BLOOD-GLUCOSE,RECEIVER,CONT
1 EACH MISCELLANEOUS
Qty: 1 EACH | Refills: 3 | Status: SHIPPED | OUTPATIENT
Start: 2025-06-04 | End: 2026-06-04

## 2025-06-04 RX ORDER — GLYBURIDE 5 MG/1
5 TABLET ORAL
Qty: 90 TABLET | Refills: 3 | Status: SHIPPED | OUTPATIENT
Start: 2025-06-04

## 2025-06-04 RX ORDER — DULOXETIN HYDROCHLORIDE 30 MG/1
30 CAPSULE, DELAYED RELEASE ORAL DAILY
Qty: 90 CAPSULE | Refills: 3 | Status: SHIPPED | OUTPATIENT
Start: 2025-06-04 | End: 2026-06-04

## 2025-06-04 RX ORDER — GLYBURIDE 5 MG/1
5 TABLET ORAL
COMMUNITY
End: 2025-06-04 | Stop reason: SDUPTHER

## 2025-06-04 RX ORDER — OXYBUTYNIN CHLORIDE 5 MG/1
5 TABLET ORAL 2 TIMES DAILY
Qty: 180 TABLET | Refills: 1 | Status: SHIPPED | OUTPATIENT
Start: 2025-06-04 | End: 2026-06-04

## 2025-06-12 ENCOUNTER — HOSPITAL ENCOUNTER (OUTPATIENT)
Dept: RADIOLOGY | Facility: HOSPITAL | Age: 75
Discharge: HOME | End: 2025-06-12
Attending: STUDENT IN AN ORGANIZED HEALTH CARE EDUCATION/TRAINING PROGRAM
Payer: MEDICARE

## 2025-06-12 ENCOUNTER — RESULTS FOLLOW-UP (OUTPATIENT)
Dept: FAMILY MEDICINE | Facility: CLINIC | Age: 75
End: 2025-06-12

## 2025-06-12 DIAGNOSIS — Z12.31 ENCOUNTER FOR SCREENING MAMMOGRAM FOR MALIGNANT NEOPLASM OF BREAST: ICD-10-CM

## 2025-06-12 DIAGNOSIS — Z78.0 ASYMPTOMATIC MENOPAUSAL STATE: ICD-10-CM

## 2025-06-12 DIAGNOSIS — R92.8 ABNORMAL MAMMOGRAM: Primary | ICD-10-CM

## 2025-06-12 PROCEDURE — 77063 BREAST TOMOSYNTHESIS BI: CPT | Mod: 26,,, | Performed by: RADIOLOGY

## 2025-06-12 PROCEDURE — 77067 SCR MAMMO BI INCL CAD: CPT | Mod: 26,,, | Performed by: RADIOLOGY

## 2025-06-12 PROCEDURE — 77080 DXA BONE DENSITY AXIAL: CPT | Mod: 26,,, | Performed by: RADIOLOGY

## 2025-06-12 PROCEDURE — 77080 DXA BONE DENSITY AXIAL: CPT | Mod: TC

## 2025-06-12 PROCEDURE — 77067 SCR MAMMO BI INCL CAD: CPT | Mod: TC

## 2025-06-12 PROCEDURE — 77092 TBS I&R FX RSK QHP: CPT | Mod: ,,, | Performed by: RADIOLOGY

## 2025-06-12 RX ORDER — NITROFURANTOIN 25; 75 MG/1; MG/1
100 CAPSULE ORAL 2 TIMES DAILY
Qty: 14 CAPSULE | Refills: 0 | Status: SHIPPED | OUTPATIENT
Start: 2025-06-12 | End: 2025-07-13

## 2025-07-08 ENCOUNTER — HOSPITAL ENCOUNTER (EMERGENCY)
Facility: HOSPITAL | Age: 75
Discharge: HOME | End: 2025-07-08
Attending: EMERGENCY MEDICINE
Payer: MEDICARE

## 2025-07-08 VITALS
HEIGHT: 63 IN | WEIGHT: 150 LBS | BODY MASS INDEX: 26.58 KG/M2 | TEMPERATURE: 98 F | DIASTOLIC BLOOD PRESSURE: 67 MMHG | OXYGEN SATURATION: 97 % | HEART RATE: 80 BPM | SYSTOLIC BLOOD PRESSURE: 158 MMHG | RESPIRATION RATE: 16 BRPM

## 2025-07-08 DIAGNOSIS — R52 PAIN: ICD-10-CM

## 2025-07-08 DIAGNOSIS — S20.212A CONTUSION OF LEFT FRONT WALL OF THORAX, INITIAL ENCOUNTER: Primary | ICD-10-CM

## 2025-07-08 PROCEDURE — 71046 X-RAY EXAM CHEST 2 VIEWS: CPT | Mod: TC

## 2025-07-08 PROCEDURE — 71046 X-RAY EXAM CHEST 2 VIEWS: CPT | Mod: 26,,, | Performed by: RADIOLOGY

## 2025-07-08 PROCEDURE — 25000003 PHARM REV CODE 250: Performed by: NURSE PRACTITIONER

## 2025-07-08 PROCEDURE — 71100 X-RAY EXAM RIBS UNI 2 VIEWS: CPT | Mod: TC,LT

## 2025-07-08 PROCEDURE — 99283 EMERGENCY DEPT VISIT LOW MDM: CPT | Mod: 25

## 2025-07-08 PROCEDURE — 71100 X-RAY EXAM RIBS UNI 2 VIEWS: CPT | Mod: 26,LT,, | Performed by: RADIOLOGY

## 2025-07-08 RX ORDER — TRAMADOL HYDROCHLORIDE 50 MG/1
50 TABLET, FILM COATED ORAL
Status: COMPLETED | OUTPATIENT
Start: 2025-07-08 | End: 2025-07-08

## 2025-07-08 RX ORDER — TRAMADOL HYDROCHLORIDE 50 MG/1
50 TABLET, FILM COATED ORAL EVERY 6 HOURS PRN
Qty: 12 TABLET | Refills: 0 | Status: SHIPPED | OUTPATIENT
Start: 2025-07-08

## 2025-07-08 RX ADMIN — TRAMADOL HYDROCHLORIDE 50 MG: 50 TABLET, COATED ORAL at 02:07

## 2025-07-08 NOTE — ED PROVIDER NOTES
Encounter Date: 7/8/2025       History     Chief Complaint   Patient presents with    Fall     Patient reports a trip fall x 3 days ago. She c/o Left rib pain .      POV to ED with family.  Patient complains of left rib pain onset 3 days ago.  States that she was at a relative's house, accidentally tripping over ripped linoleum maxx.  Landing on the floor.  Denies head injury, no LOC, no neck pain, no other injuries or any other complaints.  Pending bed placement, main ED full, diagnostics started    The history is provided by the patient. No  was used.     Review of patient's allergies indicates:   Allergen Reactions    Ciprofloxacin Rash    Levofloxacin Rash    Ace inhibitors Other (See Comments)     Cough    Codeine Other (See Comments) and Nausea And Vomiting    Nickel Rash     Past Medical History:   Diagnosis Date    Diabetes mellitus, type 2     Fibromyalgia     Hypertension     Overactive bladder      History reviewed. No pertinent surgical history.  Family History   Problem Relation Name Age of Onset    Macular degeneration Mother      Diverticulitis Mother      Liver cancer Father      Heart disease Sister       Social History[1]  Review of Systems   Constitutional:  Negative for chills and fever.   Respiratory:          Left rib pain   Musculoskeletal:  Negative for neck pain.        Denies hip pain   Neurological:         Denies head injury, no altered mental state, no LOC   All other systems reviewed and are negative.      Physical Exam     Initial Vitals [07/08/25 1119]   BP Pulse Resp Temp SpO2   (!) 158/67 80 18 98 °F (36.7 °C) 97 %      MAP       --         Physical Exam    Nursing note and vitals reviewed.  Constitutional: She appears well-developed and well-nourished. No distress.   Comfortable, talkative   HENT:   Head: Normocephalic and atraumatic. Mouth/Throat: Oropharynx is clear and moist.   Eyes: Pupils are equal, round, and reactive to light.   Neck:   No pain    Normal range of motion.  Cardiovascular:  Normal rate and regular rhythm.           Pulmonary/Chest: Breath sounds normal. No respiratory distress.   Mild tenderness noted left lateral chest wall.  No swelling, no discoloration.  Skin intact   Abdominal: Abdomen is soft. There is no abdominal tenderness.   Musculoskeletal:         General: Normal range of motion.      Cervical back: Normal range of motion.      Comments: Steady gait     Neurological: She is alert and oriented to person, place, and time. GCS score is 15. GCS eye subscore is 4. GCS verbal subscore is 5. GCS motor subscore is 6.   Skin: Skin is warm and dry.   Psychiatric: She has a normal mood and affect. Thought content normal.         ED Course   Procedures  Labs Reviewed - No data to display       Imaging Results              X-Ray Chest PA And Lateral (Final result)  Result time 07/08/25 12:14:43      Final result by Jaylen Dumont MD (07/08/25 12:14:43)                   Impression:      No acute chest disease.      Electronically signed by: Jaylen Dumont  Date:    07/08/2025  Time:    12:14               Narrative:    EXAMINATION:  XR CHEST PA AND LATERAL    CLINICAL HISTORY:  Pain, unspecified    TECHNIQUE:  PA and lateral views of the chest were performed.    COMPARISON:  None    FINDINGS:  There is mild chronic interstitial change.  No focal consolidation.  No posttraumatic pneumothorax.  No significant pleural effusion.  Heart size normal.  Bony thorax intact.                                       X-Ray Ribs 2 View Left (Final result)  Result time 07/08/25 12:15:15      Final result by Jaylen Dumont MD (07/08/25 12:15:15)                   Impression:      No plain film evidence to suggest an acute left rib fracture.      Electronically signed by: Jaylen Dumont  Date:    07/08/2025  Time:    12:15               Narrative:    EXAMINATION:  XR RIBS 2 VIEW LEFT    CLINICAL HISTORY:  Pain, unspecified    TECHNIQUE:  Multiple views of  the left ribs were performed.    COMPARISON:  Same day.    FINDINGS:  Multiple views of the left ribs demonstrate no linear lucency to suggest an acute fracture.    Visualized left lung is clear.                                    X-Rays:   Independently Interpreted Readings:   Other Readings:  EXAMINATION:  XR RIBS 2 VIEW LEFT     CLINICAL HISTORY:  Pain, unspecified     TECHNIQUE:  Multiple views of the left ribs were performed.     COMPARISON:  Same day.     FINDINGS:  Multiple views of the left ribs demonstrate no linear lucency to suggest an acute fracture.     Visualized left lung is clear.     Impression:     No plain film evidence to suggest an acute left rib fracture.    EXAMINATION:  XR CHEST PA AND LATERAL     CLINICAL HISTORY:  Pain, unspecified     TECHNIQUE:  PA and lateral views of the chest were performed.     COMPARISON:  None     FINDINGS:  There is mild chronic interstitial change.  No focal consolidation.  No posttraumatic pneumothorax.  No significant pleural effusion.  Heart size normal.  Bony thorax intact.     Impression:     No acute chest disease.      Medications   traMADoL tablet 50 mg (has no administration in time range)     Medical Decision Making  Presents for evaluation of left rib pain, see HPI  Differentials include but not limited to sprain, strain, contusion, fracture, dislocation, spasm  Discharged home, diagnosis contusion left rib.  Pain control in the ED, prescriptions for home use.  Incentive spirometer with instructions in the ED.  Instructed to Rest, increase fluids, lots of water and liquids.  Ice treatment for 48-72 hours.  Incentive spirometer use every 2 hours while awake for 1 week.  X-ray negative for acute fracture.  Call clinic for office recheck.  Return as needed. Agrees with care    Amount and/or Complexity of Data Reviewed  Radiology: ordered. Decision-making details documented in ED Course.    Risk  OTC drugs.  Prescription drug management.                                       Clinical Impression:  Final diagnoses:  [R52] Pain  [S20.212A] Contusion of left front wall of thorax, initial encounter - left rib (Primary)          ED Disposition Condition    Discharge Stable          ED Prescriptions       Medication Sig Dispense Start Date End Date Auth. Provider    traMADoL (ULTRAM) 50 mg tablet Take 1 tablet (50 mg total) by mouth every 6 (six) hours as needed for Pain. 12 tablet 7/8/2025 -- Julieta Hinton NP          Follow-up Information       Follow up With Specialties Details Why Contact Info    Cayden Morelos MD Family Medicine Call in 3 days  [address] [facility] [address]  [phone]      Mook Benoit MD Orthopedic Surgery Call in 3 days  [address] [facility] [address]  [phone]                   Julieta Hinton NP  07/08/25 1209       Julieta Hinton NP  07/08/25 4861         [1]   Social History  Tobacco Use    Smoking status: Former     Types: Cigarettes     Passive exposure: Never (Smoker in the 70's; no longer)    Smokeless tobacco: Never   Substance Use Topics    Alcohol use: Not Currently    Drug use: Not Currently        Julieta Hinton NP  07/08/25 8646

## 2025-07-08 NOTE — DISCHARGE INSTRUCTIONS
Rest, increase fluids, lots of water and liquids.  Ice treatment for 48-72 hours.  Incentive spirometer use every 2 hours while awake for 1 week.  X-ray negative for acute fracture.  Call clinic for office recheck.  Return as needed

## 2025-07-10 ENCOUNTER — PATIENT OUTREACH (OUTPATIENT)
Dept: ADMINISTRATIVE | Facility: HOSPITAL | Age: 75
End: 2025-07-10
Payer: MEDICARE

## 2025-07-10 NOTE — PROGRESS NOTES
[facility] Review & Patient Outreach Details      Contacted patient by phone:    Unable to reach/ Follow up date noted

## 2025-07-13 ENCOUNTER — HOSPITAL ENCOUNTER (EMERGENCY)
Facility: HOSPITAL | Age: 75
Discharge: HOME | End: 2025-07-13
Attending: EMERGENCY MEDICINE
Payer: MEDICARE

## 2025-07-13 VITALS
WEIGHT: 140 LBS | SYSTOLIC BLOOD PRESSURE: 132 MMHG | BODY MASS INDEX: 24.8 KG/M2 | HEART RATE: 80 BPM | DIASTOLIC BLOOD PRESSURE: 63 MMHG | RESPIRATION RATE: 22 BRPM | HEIGHT: 63 IN | OXYGEN SATURATION: 99 %

## 2025-07-13 DIAGNOSIS — E16.2 HYPOGLYCEMIA: Primary | ICD-10-CM

## 2025-07-13 DIAGNOSIS — T88.7XXA MEDICATION SIDE EFFECT: ICD-10-CM

## 2025-07-13 DIAGNOSIS — R41.0 CONFUSION: ICD-10-CM

## 2025-07-13 DIAGNOSIS — R01.1 SYSTOLIC MURMUR: ICD-10-CM

## 2025-07-13 LAB
BILIRUB UR QL STRIP.AUTO: NEGATIVE
CLARITY UR: CLEAR
COLOR UR AUTO: YELLOW
GLUCOSE UR QL STRIP: NEGATIVE
HGB UR QL STRIP: NEGATIVE
KETONES UR QL STRIP: NEGATIVE
LEUKOCYTE ESTERASE UR QL STRIP: NEGATIVE
NITRITE UR QL STRIP: NEGATIVE
PH UR STRIP: 7 [PH]
PROT UR QL STRIP: NEGATIVE
SP GR UR STRIP: 1.01
UROBILINOGEN UR STRIP-ACNC: ABNORMAL EU/DL

## 2025-07-13 PROCEDURE — 93010 ELECTROCARDIOGRAM REPORT: CPT | Mod: ,,, | Performed by: INTERNAL MEDICINE

## 2025-07-13 PROCEDURE — 93005 ELECTROCARDIOGRAM TRACING: CPT | Performed by: INTERNAL MEDICINE

## 2025-07-13 PROCEDURE — 99283 EMERGENCY DEPT VISIT LOW MDM: CPT | Mod: 25

## 2025-07-13 PROCEDURE — 81003 URINALYSIS AUTO W/O SCOPE: CPT | Performed by: EMERGENCY MEDICINE

## 2025-07-14 ENCOUNTER — HOSPITAL ENCOUNTER (OUTPATIENT)
Facility: HOSPITAL | Age: 75
Discharge: HOME | End: 2025-07-14
Attending: EMERGENCY MEDICINE | Admitting: INTERNAL MEDICINE
Payer: MEDICARE

## 2025-07-14 VITALS
WEIGHT: 140 LBS | SYSTOLIC BLOOD PRESSURE: 139 MMHG | DIASTOLIC BLOOD PRESSURE: 64 MMHG | OXYGEN SATURATION: 96 % | TEMPERATURE: 98 F | HEART RATE: 100 BPM | RESPIRATION RATE: 17 BRPM | BODY MASS INDEX: 24.8 KG/M2 | HEIGHT: 63 IN

## 2025-07-14 DIAGNOSIS — E16.0 HYPOGLYCEMIA SECONDARY TO SULFONYLUREA, ACCIDENTAL OR UNINTENTIONAL, INITIAL ENCOUNTER: Primary | ICD-10-CM

## 2025-07-14 DIAGNOSIS — E16.2 HYPOGLYCEMIA: ICD-10-CM

## 2025-07-14 DIAGNOSIS — R07.9 CHEST PAIN: ICD-10-CM

## 2025-07-14 DIAGNOSIS — T38.3X1A HYPOGLYCEMIA SECONDARY TO SULFONYLUREA, ACCIDENTAL OR UNINTENTIONAL, INITIAL ENCOUNTER: Primary | ICD-10-CM

## 2025-07-14 PROBLEM — N18.30 TYPE 2 DIABETES MELLITUS WITH STAGE 3 CHRONIC KIDNEY DISEASE, WITHOUT LONG-TERM CURRENT USE OF INSULIN: Status: ACTIVE | Noted: 2024-02-16

## 2025-07-14 PROBLEM — N18.31 CHRONIC KIDNEY DISEASE, STAGE 3A: Status: RESOLVED | Noted: 2025-06-04 | Resolved: 2025-07-14

## 2025-07-14 PROBLEM — N32.81 OVERACTIVE BLADDER: Status: ACTIVE | Noted: 2025-07-14

## 2025-07-14 PROBLEM — E11.649 HYPOGLYCEMIA ASSOCIATED WITH TYPE 2 DIABETES MELLITUS: Status: ACTIVE | Noted: 2025-07-14

## 2025-07-14 PROBLEM — E11.39: Status: RESOLVED | Noted: 2024-02-16 | Resolved: 2025-07-14

## 2025-07-14 PROBLEM — E11.22 TYPE 2 DIABETES MELLITUS WITH STAGE 3 CHRONIC KIDNEY DISEASE, WITHOUT LONG-TERM CURRENT USE OF INSULIN: Status: ACTIVE | Noted: 2024-02-16

## 2025-07-14 LAB
ABSOLUTE EOSINOPHIL (OHS): 0.17 K/UL
ABSOLUTE MONOCYTE (OHS): 0.71 K/UL (ref 0.3–1)
ABSOLUTE NEUTROPHIL COUNT (OHS): 4.61 K/UL (ref 1.8–7.7)
ALBUMIN SERPL BCP-MCNC: 3.3 G/DL (ref 3.5–5.2)
ALP SERPL-CCNC: 121 UNIT/L (ref 40–150)
ALT SERPL W/O P-5'-P-CCNC: 24 UNIT/L (ref 10–44)
ANION GAP (OHS): 10 MMOL/L (ref 8–16)
AST SERPL-CCNC: 37 UNIT/L (ref 11–45)
BASOPHILS # BLD AUTO: 0.03 K/UL
BASOPHILS NFR BLD AUTO: 0.5 %
BILIRUB SERPL-MCNC: 0.6 MG/DL (ref 0.1–1)
BUN SERPL-MCNC: 26 MG/DL (ref 8–23)
CALCIUM SERPL-MCNC: 9.1 MG/DL (ref 8.7–10.5)
CHLORIDE SERPL-SCNC: 101 MMOL/L (ref 95–110)
CO2 SERPL-SCNC: 26 MMOL/L (ref 23–29)
CREAT SERPL-MCNC: 1.1 MG/DL (ref 0.5–1.4)
ERYTHROCYTE [DISTWIDTH] IN BLOOD BY AUTOMATED COUNT: 14.6 % (ref 11.5–14.5)
GFR SERPLBLD CREATININE-BSD FMLA CKD-EPI: 53 ML/MIN/1.73/M2
GLUCOSE SERPL-MCNC: 86 MG/DL (ref 70–110)
HCT VFR BLD AUTO: 36 % (ref 37–48.5)
HGB BLD-MCNC: 11.3 GM/DL (ref 12–16)
IMM GRANULOCYTES # BLD AUTO: 0.02 K/UL (ref 0–0.04)
IMM GRANULOCYTES NFR BLD AUTO: 0.3 % (ref 0–0.5)
LYMPHOCYTES # BLD AUTO: 0.88 K/UL (ref 1–4.8)
MCH RBC QN AUTO: 27.7 PG (ref 27–31)
MCHC RBC AUTO-ENTMCNC: 31.4 G/DL (ref 32–36)
MCV RBC AUTO: 88 FL (ref 82–98)
NUCLEATED RBC (/100WBC) (OHS): 0 /100 WBC
OHS QRS DURATION: 148 MS
OHS QTC CALCULATION: 505 MS
PLATELET # BLD AUTO: 148 K/UL (ref 150–450)
PMV BLD AUTO: 10.9 FL (ref 9.2–12.9)
POCT GLUCOSE: 122 MG/DL (ref 70–110)
POCT GLUCOSE: 181 MG/DL (ref 70–110)
POCT GLUCOSE: 208 MG/DL (ref 70–110)
POCT GLUCOSE: 289 MG/DL (ref 70–110)
POCT GLUCOSE: 325 MG/DL (ref 70–110)
POCT GLUCOSE: 75 MG/DL (ref 70–110)
POTASSIUM SERPL-SCNC: 3.8 MMOL/L (ref 3.5–5.1)
PROT SERPL-MCNC: 6.9 GM/DL (ref 6–8.4)
RBC # BLD AUTO: 4.08 M/UL (ref 4–5.4)
RELATIVE EOSINOPHIL (OHS): 2.6 %
RELATIVE LYMPHOCYTE (OHS): 13.7 % (ref 18–48)
RELATIVE MONOCYTE (OHS): 11.1 % (ref 4–15)
RELATIVE NEUTROPHIL (OHS): 71.8 % (ref 38–73)
SODIUM SERPL-SCNC: 137 MMOL/L (ref 136–145)
WBC # BLD AUTO: 6.42 K/UL (ref 3.9–12.7)

## 2025-07-14 PROCEDURE — 99285 EMERGENCY DEPT VISIT HI MDM: CPT | Mod: 25

## 2025-07-14 PROCEDURE — G0378 HOSPITAL OBSERVATION PER HR: HCPCS

## 2025-07-14 PROCEDURE — 99239 HOSP IP/OBS DSCHRG MGMT >30: CPT | Mod: ,,, | Performed by: INTERNAL MEDICINE

## 2025-07-14 PROCEDURE — 96374 THER/PROPH/DIAG INJ IV PUSH: CPT

## 2025-07-14 PROCEDURE — 82962 GLUCOSE BLOOD TEST: CPT | Mod: MUE

## 2025-07-14 PROCEDURE — 25000003 PHARM REV CODE 250: Performed by: HOSPITALIST

## 2025-07-14 PROCEDURE — 63600175 PHARM REV CODE 636 W HCPCS: Performed by: HOSPITALIST

## 2025-07-14 PROCEDURE — 85025 COMPLETE CBC W/AUTO DIFF WBC: CPT | Performed by: EMERGENCY MEDICINE

## 2025-07-14 PROCEDURE — 71045 X-RAY EXAM CHEST 1 VIEW: CPT | Mod: 26,,, | Performed by: RADIOLOGY

## 2025-07-14 PROCEDURE — 25000003 PHARM REV CODE 250: Performed by: EMERGENCY MEDICINE

## 2025-07-14 PROCEDURE — 63600175 PHARM REV CODE 636 W HCPCS: Mod: JA | Performed by: EMERGENCY MEDICINE

## 2025-07-14 PROCEDURE — 71045 X-RAY EXAM CHEST 1 VIEW: CPT | Mod: TC

## 2025-07-14 PROCEDURE — 99223 1ST HOSP IP/OBS HIGH 75: CPT | Mod: NSCH,95,, | Performed by: HOSPITALIST

## 2025-07-14 PROCEDURE — 51798 US URINE CAPACITY MEASURE: CPT

## 2025-07-14 PROCEDURE — 96375 TX/PRO/DX INJ NEW DRUG ADDON: CPT

## 2025-07-14 PROCEDURE — 96376 TX/PRO/DX INJ SAME DRUG ADON: CPT

## 2025-07-14 PROCEDURE — 96372 THER/PROPH/DIAG INJ SC/IM: CPT | Performed by: HOSPITALIST

## 2025-07-14 PROCEDURE — 80053 COMPREHEN METABOLIC PANEL: CPT | Performed by: EMERGENCY MEDICINE

## 2025-07-14 RX ORDER — OCTREOTIDE ACETATE 100 UG/ML
100 INJECTION, SOLUTION INTRAVENOUS; SUBCUTANEOUS
Status: COMPLETED | OUTPATIENT
Start: 2025-07-14 | End: 2025-07-14

## 2025-07-14 RX ORDER — DEXTROSE MONOHYDRATE 100 MG/ML
INJECTION, SOLUTION INTRAVENOUS CONTINUOUS
Status: DISCONTINUED | OUTPATIENT
Start: 2025-07-14 | End: 2025-07-14 | Stop reason: HOSPADM

## 2025-07-14 RX ORDER — SODIUM CHLORIDE 0.9 % (FLUSH) 0.9 %
10 SYRINGE (ML) INJECTION EVERY 12 HOURS PRN
Status: DISCONTINUED | OUTPATIENT
Start: 2025-07-14 | End: 2025-07-14 | Stop reason: HOSPADM

## 2025-07-14 RX ORDER — CYCLOBENZAPRINE HCL 5 MG
10 TABLET ORAL NIGHTLY PRN
Status: DISCONTINUED | OUTPATIENT
Start: 2025-07-14 | End: 2025-07-14 | Stop reason: HOSPADM

## 2025-07-14 RX ORDER — OXYBUTYNIN CHLORIDE 5 MG/1
5 TABLET ORAL 2 TIMES DAILY
Status: DISCONTINUED | OUTPATIENT
Start: 2025-07-14 | End: 2025-07-14 | Stop reason: HOSPADM

## 2025-07-14 RX ORDER — GABAPENTIN 300 MG/1
300 CAPSULE ORAL 2 TIMES DAILY
Status: DISCONTINUED | OUTPATIENT
Start: 2025-07-14 | End: 2025-07-14 | Stop reason: HOSPADM

## 2025-07-14 RX ORDER — NALOXONE HCL 0.4 MG/ML
0.02 VIAL (ML) INJECTION
Status: DISCONTINUED | OUTPATIENT
Start: 2025-07-14 | End: 2025-07-14 | Stop reason: HOSPADM

## 2025-07-14 RX ORDER — GLUCAGON 1 MG
1 KIT INJECTION
Status: DISCONTINUED | OUTPATIENT
Start: 2025-07-14 | End: 2025-07-14 | Stop reason: HOSPADM

## 2025-07-14 RX ORDER — IBUPROFEN 200 MG
16 TABLET ORAL
Status: DISCONTINUED | OUTPATIENT
Start: 2025-07-14 | End: 2025-07-14 | Stop reason: HOSPADM

## 2025-07-14 RX ORDER — DULOXETIN HYDROCHLORIDE 30 MG/1
30 CAPSULE, DELAYED RELEASE ORAL DAILY
Status: DISCONTINUED | OUTPATIENT
Start: 2025-07-14 | End: 2025-07-14 | Stop reason: HOSPADM

## 2025-07-14 RX ORDER — LOSARTAN POTASSIUM 25 MG/1
50 TABLET ORAL DAILY
Status: DISCONTINUED | OUTPATIENT
Start: 2025-07-14 | End: 2025-07-14 | Stop reason: HOSPADM

## 2025-07-14 RX ORDER — INSULIN ASPART 100 [IU]/ML
0-5 INJECTION, SOLUTION INTRAVENOUS; SUBCUTANEOUS
Status: DISCONTINUED | OUTPATIENT
Start: 2025-07-14 | End: 2025-07-14 | Stop reason: HOSPADM

## 2025-07-14 RX ORDER — TALC
6 POWDER (GRAM) TOPICAL NIGHTLY PRN
Status: DISCONTINUED | OUTPATIENT
Start: 2025-07-14 | End: 2025-07-14 | Stop reason: HOSPADM

## 2025-07-14 RX ORDER — HYDROCHLOROTHIAZIDE 12.5 MG/1
12.5 TABLET ORAL DAILY
Status: DISCONTINUED | OUTPATIENT
Start: 2025-07-14 | End: 2025-07-14 | Stop reason: HOSPADM

## 2025-07-14 RX ORDER — DEXTROSE MONOHYDRATE 100 MG/ML
INJECTION, SOLUTION INTRAVENOUS
Status: COMPLETED | OUTPATIENT
Start: 2025-07-14 | End: 2025-07-14

## 2025-07-14 RX ORDER — ONDANSETRON 4 MG/1
8 TABLET, ORALLY DISINTEGRATING ORAL EVERY 8 HOURS PRN
Status: DISCONTINUED | OUTPATIENT
Start: 2025-07-14 | End: 2025-07-14 | Stop reason: HOSPADM

## 2025-07-14 RX ORDER — TRAMADOL HYDROCHLORIDE 50 MG/1
50 TABLET, FILM COATED ORAL EVERY 6 HOURS PRN
Status: DISCONTINUED | OUTPATIENT
Start: 2025-07-14 | End: 2025-07-14 | Stop reason: HOSPADM

## 2025-07-14 RX ORDER — IBUPROFEN 200 MG
24 TABLET ORAL
Status: DISCONTINUED | OUTPATIENT
Start: 2025-07-14 | End: 2025-07-14 | Stop reason: HOSPADM

## 2025-07-14 RX ORDER — ACETAMINOPHEN 325 MG/1
650 TABLET ORAL EVERY 4 HOURS PRN
Status: DISCONTINUED | OUTPATIENT
Start: 2025-07-14 | End: 2025-07-14 | Stop reason: HOSPADM

## 2025-07-14 RX ORDER — ENOXAPARIN SODIUM 100 MG/ML
40 INJECTION SUBCUTANEOUS EVERY 24 HOURS
Status: DISCONTINUED | OUTPATIENT
Start: 2025-07-14 | End: 2025-07-14 | Stop reason: HOSPADM

## 2025-07-14 RX ADMIN — DEXTROSE MONOHYDRATE: 100 INJECTION, SOLUTION INTRAVENOUS at 07:07

## 2025-07-14 RX ADMIN — HYDROCHLOROTHIAZIDE 12.5 MG: 12.5 TABLET ORAL at 09:07

## 2025-07-14 RX ADMIN — DEXTROSE MONOHYDRATE: 100 INJECTION, SOLUTION INTRAVENOUS at 02:07

## 2025-07-14 RX ADMIN — OXYBUTYNIN CHLORIDE 5 MG: 5 TABLET ORAL at 09:07

## 2025-07-14 RX ADMIN — INSULIN ASPART 4 UNITS: 100 INJECTION, SOLUTION INTRAVENOUS; SUBCUTANEOUS at 09:07

## 2025-07-14 RX ADMIN — OCTREOTIDE ACETATE 100 MCG: 100 INJECTION, SOLUTION INTRAVENOUS; SUBCUTANEOUS at 02:07

## 2025-07-14 RX ADMIN — LOSARTAN POTASSIUM 50 MG: 25 TABLET, FILM COATED ORAL at 09:07

## 2025-07-14 NOTE — ED PROVIDER NOTES
History     Chief Complaint   Patient presents with    Altered Mental Status     Pt feels fine, but admits to confusion earlier. She states she normally has confusions when she has a bladder infection.  Has not had any pain medicine today. She was prescribed pain medicine for back pain.  Bg was 54. Ate a sandwich and cupcakes. Bg 88 prior to arrival from home      HPI:  Suma Cruz is a 74 y.o. female with PMH as below who presents to the [facility] emergency department for evaluation of sensation of transient confusion at home, with glucose of 54 per family member. She was recently started on glyburide 5 mg once daily and feels it may be too strong for her. She also notes history of UTI and notes this feels similar to confusion she's had with prior UTIs. She currently feels asymptomatic. She has no other complaints.     PCP: Cayden Morelos MD    Review of patient's allergies indicates:   Allergen Reactions    Ciprofloxacin Rash    Levofloxacin Rash    Ace inhibitors Other (See Comments)     Cough    Codeine Other (See Comments) and Nausea And Vomiting    Nickel Rash      Past Medical History:   Diagnosis Date    Diabetes mellitus, type 2     Fibromyalgia     Hypertension     Overactive bladder      History reviewed. No pertinent surgical history.    Family History   Problem Relation Name Age of Onset    Macular degeneration Mother      Diverticulitis Mother      Liver cancer Father      Heart disease Sister       Social History     Tobacco Use    Smoking status: Former     Types: Cigarettes     Passive exposure: Never (Smoker in the 70's; no longer)    Smokeless tobacco: Never   Vaping Use    Vaping status: Never Used   Substance and Sexual Activity    Alcohol use: Not Currently    Drug use: Not Currently    Sexual activity: Not on file      Review of Systems     Review of Systems   Constitutional: Negative.  Negative for fever.   HENT: Negative.     Eyes: Negative.    Respiratory: Negative.   "Negative for cough and shortness of breath.    Cardiovascular: Negative.    Gastrointestinal: Negative.    Endocrine: Negative.    Genitourinary: Negative.  Negative for dysuria.   Musculoskeletal: Negative.    Skin: Negative.    Allergic/Immunologic: Negative.    Neurological: Negative.    Hematological: Negative.    Psychiatric/Behavioral: Negative.     All other systems reviewed and are negative.       Physical Exam     Initial Vitals [07/13/25 1904]   BP Pulse Resp Temp SpO2   (!) 111/53 95 19 -- 100 %      MAP       --          Nursing notes and vital signs reviewed.  Constitutional: Patient is in no acute distress.   Head: Normocephalic. Atraumatic.   Eyes:  Conjunctivae are not pale. No scleral icterus.   ENT: Mucous membranes moist.   Neck: Supple.   Cardiovascular: Regular rate. Regular rhythm. +Systolic murmur noted.   Pulmonary: No respiratory distress. Faint RLL crackles.   Abdominal: Non-distended.   Musculoskeletal: Moves all extremities. No obvious deformities.   Skin: Warm and dry.   Neurological:  Alert, awake, and appropriate. Normal speech. Strength 5/5 in all four extremities.  CNII-XII intact. No dysmetria. Normal tone. Follows commands.  Intact speech, intact comprehension.   Psychiatric: Normal affect.       ED Course   Procedures  Vitals:    07/13/25 1904 07/13/25 1948 07/13/25 2004 07/13/25 2031   BP: (!) 111/53 (!) 117/58 (!) 120/57    Pulse: 95 76 77 77   Resp: 19 (!) 24 (!) 24 (!) 22   SpO2: 100% 99% 100% 99%   Weight: 63.5 kg (140 lb)      Height: 5' 3" (1.6 m)       07/13/25 2047 07/13/25 2048   BP:  132/63   Pulse:  80   Resp:     SpO2: 99%    Weight:     Height:       Lab Results Interpreted as Abnormal:  Labs Reviewed   URINALYSIS, REFLEX TO URINE CULTURE - Abnormal       Result Value    Color, UA Yellow      Appearance, UA Clear      pH, UA 7.0      Spec Grav UA 1.015      Protein, UA Negative      Glucose, UA Negative      Ketones, UA Negative      Bilirubin, UA Negative      " Blood, UA Negative      Nitrites, UA Negative      Urobilinogen, UA 2.0-3.0 (*)     Leukocyte Esterase, UA Negative     GREY TOP URINE HOLD      All Lab Results:  Results for orders placed or performed during the hospital encounter of 07/13/25   Urinalysis, Reflex to Urine Culture Urine, Clean Catch    Collection Time: 07/13/25  7:28 PM    Specimen: Urine   Result Value Ref Range    Color, UA Yellow Straw, Ekaterina, Yellow, Light-Orange    Appearance, UA Clear Clear    pH, UA 7.0 5.0 - 8.0    Spec Grav UA 1.015 1.005 - 1.030    Protein, UA Negative Negative    Glucose, UA Negative Negative    Ketones, UA Negative Negative    Bilirubin, UA Negative Negative    Blood, UA Negative Negative    Nitrites, UA Negative Negative    Urobilinogen, UA 2.0-3.0 (A) <2.0 EU/dL    Leukocyte Esterase, UA Negative Negative     Imaging Results    None          The emergency physician reviewed the vital signs / test results outlined above.     ED Discussion      Patient observed with in the ED and remains asymptomatic with normal glucose. Discharging home with strict hypoglycemia return precautions.     Patient's evaluation in the ED does not suggest any emergent or life-threatening medical conditions requiring immediate intervention beyond what was provided in the ED, and I believe patient is safe for discharge. Regardless, an unremarkable evaluation in the ED does not preclude the development or presence of a serious or life-threatening condition. As such, patient was given return instructions for any change or worsening of symptoms.       ED Medication(s) Administered:  Medications - No data to display    Prescription Management: I performed a review of the patient's current Rx medication list as input by nursing staff.    Discharge Medication List as of 7/13/2025  9:06 PM        CONTINUE these medications which have NOT CHANGED    Details   alcohol swabs (BD ALCOHOL SWABS) PadM Apply 1 each topically 2 (two) times a day., Starting Wed  2/21/2024, Normal      blood glucose control, low (TRUE METRIX LEVEL 1) Soln Inject 1 each into the skin 2 (two) times a day., Starting Wed 2/21/2024, Normal      blood sugar diagnostic (TRUE METRIX GLUCOSE TEST STRIP) Strp Inject 1 each into the skin 2 (two) times a day., Starting Wed 2/21/2024, Normal      blood-glucose meter (TRUE METRIX AIR GLUCOSE METER) Misc Inject 1 each into the skin once daily., Starting Wed 2/21/2024, Normal      blood-glucose sensor (FREESTYLE CHAPARRITA 3 SENSOR) Nguyen 1 Device by Misc.(Non-Drug; Combo Route) route every 14 (fourteen) days., Starting Wed 6/4/2025, Until Thu 6/4/2026, Normal      blood-glucose,,cont (FREESTYLE CHAPARRITA 3 READER) Misc 1 each by Misc.(Non-Drug; Combo Route) route every 14 (fourteen) days., Starting Wed 6/4/2025, Until Thu 6/4/2026, Normal      cyclobenzaprine (FLEXERIL) 10 MG tablet Take 1 tablet (10 mg total) by mouth every evening., Starting Wed 6/4/2025, Until Thu 6/4/2026, Normal      DULoxetine (CYMBALTA) 30 MG capsule Take 1 capsule (30 mg total) by mouth once daily., Starting Wed 6/4/2025, Until Thu 6/4/2026, Normal      gabapentin (NEURONTIN) 300 MG capsule Take 1 capsule (300 mg total) by mouth 2 (two) times daily., Starting Wed 6/4/2025, Until Thu 6/4/2026, Normal      glyBURIDE (DIABETA) 5 MG tablet Take 1 tablet (5 mg total) by mouth daily with breakfast., Starting Wed 6/4/2025, Normal      !! lancets (TRUEPLUS LANCETS) 30 gauge Misc Inject 1 lancet  into the skin 2 (two) times a day., Starting Wed 2/21/2024, Normal      losartan-hydrochlorothiazide 50-12.5 mg (HYZAAR) 50-12.5 mg per tablet Take 1 tablet by mouth once daily., Starting Wed 6/4/2025, Until Thu 6/4/2026, Normal      oxybutynin (DITROPAN) 5 MG Tab Take 1 tablet (5 mg total) by mouth 2 (two) times daily., Starting Wed 6/4/2025, Until Thu 6/4/2026, Normal      traMADoL (ULTRAM) 50 mg tablet Take 1 tablet (50 mg total) by mouth every 6 (six) hours as needed for Pain., Starting Tue  7/8/2025, Normal      !! TRUEPLUS LANCETS 33 gauge Misc Apply topically., Starting Mon 8/28/2023, Historical Med       !! - Potential duplicate medications found. Please discuss with provider.        STOP taking these medications       nitrofurantoin, macrocrystal-monohydrate, (MACROBID) 100 MG capsule Comments:   Reason for Stopping:                 Follow-up Information       Schedule an appointment as soon as possible for a visit  with Cayden Morelos MD.    Specialty: Family Medicine  Contact information:  [address] [facility] [address] [phone]               [facility] Emergency Dept.    Specialty: Emergency Medicine  Why: As needed, If symptoms worsen  Contact information:  [address] [facility] [address] [phone]                          Clinical Impression       ICD-10-CM ICD-9-CM   1. Hypoglycemia  E16.2 251.2   2. Confusion  R41.0 298.9   3. Medication side effect  T88.7XXA 995.20   4. Systolic murmur  R01.1 785.2      ED Disposition Condition    Discharge Stable             Douglas May MD  07/14/25 0238

## 2025-07-14 NOTE — ASSESSMENT & PLAN NOTE
Patient's FSGs are uncontrolled due to hypoglycemia on current medication regimen.  Last A1c reviewed-   Lab Results   Component Value Date    HGBA1C 6.2 (H) 06/12/2025     Most recent fingerstick glucose reviewed-   Recent Labs   Lab 07/14/25  0245 07/14/25  0359 07/14/25  0509 07/14/25  0738   POCTGLUCOSE 75 181* 289* 325*   Current correctional scale  Low  Decrease anti-hyperglycemic dose as follows- Stop here home glyburide.   Antihyperglycemics (From admission, onward)     Antihyperglycemics (From admission, onward)      Start     Stop Route Frequency Ordered    07/14/25 0627  insulin aspart U-100 pen 0-5 Units         -- SubQ Before meals & nightly PRN 07/14/25 0538        Hold Oral hypoglycemics while patient is in the hospital.  Continue D10 at 40 mL/hr for now.   Encourage oral diet.   Accuchecks q4 hrs.   Pt to follow up with her PCP  Patient's FSGs are uncontrolled due to hypoglycemia on current medication regimen.  Last A1c reviewed-   Lab Results     Lab Results   Component Value Date    HGBA1C 6.2 (H) 06/12/2025   Most recent fingerstick glucose reviewed-   Recent Labs     Recent Labs   Lab 07/14/25  0245 07/14/25  0359 07/14/25  0509 07/14/25  0738   POCTGLUCOSE 75 181* 289* 325*   Decrease anti-hyperglycemic dose as follows- Stop here home glyburide.   Antihyperglycemics (From admission, onward)       Antihyperglycemics (From admission, onward)      Start     Stop Route Frequency Ordered    07/14/25 0627  insulin aspart U-100 pen 0-5 Units         -- SubQ Before meals & nightly PRN 07/14/25 0538        Continue D10 at 40 mL/hr for now.   Encourage oral diet.   Accuchecks q4 hrs.   Pt is now being discharged  Follow up with PCP

## 2025-07-14 NOTE — PLAN OF CARE
07/14/25 1122   LUIS Message   Medicare Outpatient and Observation Notification regarding financial responsibility Given to patient/caregiver;Explained to patient/caregiver;Signed/date by patient/caregiver   Date LUIS was signed 07/14/25   Time LUIS was signed 1122     LUIS signed by patient.

## 2025-07-14 NOTE — ASSESSMENT & PLAN NOTE
Anemia is likely due to chronic disease due to Chronic Kidney Disease. Most recent hemoglobin and hematocrit are listed below.  Recent Labs     07/14/25  0205   HGB 11.3*   HCT 36.0*     Plan  - Will not monitor CBC during this admission as it is stable.   - Transfuse PRBC if patient becomes hemodynamically unstable, symptomatic or H/H drops below 7/21.  - Patient has not received any PRBC transfusions to date  - Patient's anemia is currently stable

## 2025-07-14 NOTE — ASSESSMENT & PLAN NOTE
Anemia is likely due to chronic disease due to Chronic Kidney Disease. Most recent hemoglobin and hematocrit are listed below.  Recent Labs     07/14/25  0205   HGB 11.3*   HCT 36.0*     Plan  - Will not monitor CBC during this admission as it is stable.   - Transfuse PRBC if patient becomes hemodynamically unstable, symptomatic or H/H drops below 7/21.  - Patient has not received any PRBC transfusions to date  - Patient's anemia is currently stable  - No further treatment.

## 2025-07-14 NOTE — ASSESSMENT & PLAN NOTE
Patient's FSGs are uncontrolled due to hypoglycemia on current medication regimen.  Last A1c reviewed-   Lab Results   Component Value Date    HGBA1C 6.2 (H) 06/12/2025     Most recent fingerstick glucose reviewed-   Recent Labs   Lab 07/14/25  0157 07/14/25  0245 07/14/25  0359 07/14/25  0509   POCTGLUCOSE 122* 75 181* 289*     Current correctional scale  Low  Decrease anti-hyperglycemic dose as follows- Stop here home glyburide.   Antihyperglycemics (From admission, onward)      Start     Stop Route Frequency Ordered    07/14/25 0627  insulin aspart U-100 pen 0-5 Units         -- SubQ Before meals & nightly PRN 07/14/25 0538          Hold Oral hypoglycemics while patient is in the hospital.  Continue D10 at 40 mL/hr for now.   Encourage oral diet.   Accuchecks q4 hrs.

## 2025-07-14 NOTE — DISCHARGE SUMMARY
[facility], [place] Emergency Dept  Hospital Medicine  Discharge Summary      Patient Name: Suma Cruz  MRN: [id number]  [facility]: [id number]  Patient Class: OP- Observation  Admission Date: 7/14/2025  Hospital Length of Stay: 0 days  Discharge Date and Time: 07/14/2025 12:14 PM  Attending Physician: Ronald Gee MD   Discharging Provider: PACKER MD  Primary Care Provider: Cayden Morelos MD    Primary Care Team: Networked reference to record PCT     HPI:   Ms. Cruz is a 75 yo female with DM2, HTN, and fibromyalgia who presented to [place] ED for confusion and hypoglycemia. This is her second visit in this 24 hour period for the same thing. She was tolerating a diet and blood sugar was stable in the normal range when discharged home earlier in the day. This evening she returns with confusion, difficulty walking and her blood sugar was found to be 44. She only takes glyburide 5 mg daily and last took a dose the morning of 7/13. She says that she is eating well and has not changed her eating habits. She was started on the glyburide in June 2025 as her blood sugar was running in the 200s prior. She checks her sugars twice a day at home and it has been in the 100s or lower. In the ED this time, she was given a dose of octreotide and started on D10 infusion. She is currently doing well and has not particular complaints.     * No surgery found *      Hospital Course:   Ms. Cruz is a 75 yo female with DM2, HTN, and fibromyalgia who presented to [place] ED for confusion and hypoglycemia. This is her second visit in this 24 hour period for the same thing. She was tolerating a diet and blood sugar was stable in the normal range when discharged home earlier in the day. This evening she returns with confusion, difficulty walking and her blood sugar was found to be 44. She only takes glyburide 5 mg daily and last took a dose the morning of 7/13. She says that she is eating well and has not changed her eating  habits. She was started on the glyburide in June 2025 as her blood sugar was running in the 200s prior. She checks her sugars twice a day at home and it has been in the 100s or lower. In the ED this time, she was given a dose of octreotide and started on D10 infusion. She is currently doing well and has no other complaints.   Pt's BG has been >300, so she is being discharged today. Pt will follow up with her PCP. She was instructed not to take Glyburide anymore but to continue to check her BG BID, write them down, and present them to her PCP for any medication adjustment. Pt has no complaints and is stable. Her VS are stable and she feels that she is at her baseline.      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  Last A1c reviewed-   Lab Results   Component Value Date    HGBA1C 6.2 (H) 06/12/2025     Most recent fingerstick glucose reviewed-   Recent Labs   Lab 07/14/25  0245 07/14/25  0359 07/14/25  0509 07/14/25  0738   POCTGLUCOSE 75 181* 289* 325*   Current correctional scale  Low  Decrease anti-hyperglycemic dose as follows- Stop here home glyburide.   Antihyperglycemics (From admission, onward)     Antihyperglycemics (From admission, onward)      Start     Stop Route Frequency Ordered    07/14/25 0627  insulin aspart U-100 pen 0-5 Units         -- SubQ Before meals & nightly PRN 07/14/25 0538        Hold Oral hypoglycemics while patient is in the hospital.  Continue D10 at 40 mL/hr for now.   Encourage oral diet.   Accuchecks q4 hrs.   Pt to follow up with her PCP  Patient's FSGs are uncontrolled due to hypoglycemia on current medication regimen.  Last A1c reviewed-   Lab Results     Lab Results   Component Value Date    HGBA1C 6.2 (H) 06/12/2025   Most recent  fingerstick glucose reviewed-   Recent Labs     Recent Labs   Lab 07/14/25  0245 07/14/25  0359 07/14/25  0509 07/14/25  0738   POCTGLUCOSE 75 181* 289* 325*   Decrease anti-hyperglycemic dose as follows- Stop here home glyburide.   Antihyperglycemics (From admission, onward)       Antihyperglycemics (From admission, onward)      Start     Stop Route Frequency Ordered    07/14/25 0627  insulin aspart U-100 pen 0-5 Units         -- SubQ Before meals & nightly PRN 07/14/25 0538        Continue D10 at 40 mL/hr for now.   Encourage oral diet.   Accuchecks q4 hrs.   Pt is now being discharged  Follow up with PCP  Anemia of chronic disease  Anemia is likely due to chronic disease due to Chronic Kidney Disease. Most recent hemoglobin and hematocrit are listed below.  Recent Labs     07/14/25  0205   HGB 11.3*   HCT 36.0*     Plan  - Will not monitor CBC during this admission as it is stable.   - Transfuse PRBC if patient becomes hemodynamically unstable, symptomatic or H/H drops below 7/21.  - Patient has not received any PRBC transfusions to date  - Patient's anemia is currently stable  - No further treatment.   Diabetic neuropathy  Continue home gabapentin 300 mg TID  Follow up with PCP  Type 2 diabetes mellitus with stage 3 chronic kidney disease, without long-term current use of insulin  Creatine stable for now. BMP reviewed- noted Estimated Creatinine Clearance: 40.8 mL/min (based on SCr of 1.1 mg/dL). according to latest data. Based on current GFR, CKD stage is stage 3 - GFR 30-59.  Monitor UOP and serial BMP and adjust therapy as needed. Renally dose meds. Avoid nephrotoxic medications and procedures.  Fibromyalgia  Continue her home duloxetine 30 mg daily, cyclobenzaprine 10 mg qHS PRN and tramadol PRN.     Chronic kidney disease, stage 3a (Resolved: 7/14/2025)  Creatine stable for now. BMP reviewed- noted Estimated Creatinine Clearance: 40.8 mL/min (based on SCr of 1.1 mg/dL). according to latest data. Based on  current GFR, CKD stage is stage 3 - GFR 30-59.  Monitor UOP and serial BMP and adjust therapy as needed. Renally dose meds. Avoid nephrotoxic medications and procedures.  Overactive bladder  Continue her home oxybutynin 5 mg BID.     Final Active Diagnoses:    Diagnosis Date Noted POA    PRINCIPAL PROBLEM:  Hypoglycemia associated with type 2 diabetes mellitus [E11.649] 07/14/2025 Yes    Overactive bladder [N32.81] 07/14/2025 Yes    Anemia of chronic disease [D63.8] 02/16/2024 Yes    Type 2 diabetes mellitus with stage 3 chronic kidney disease, without long-term current use of insulin [E11.22, N18.30] 02/16/2024 Yes    Diabetic neuropathy [E11.40] 02/16/2024 Yes    Fibromyalgia [M79.7] 02/16/2024 Yes      Problems Resolved During this Admission:    Diagnosis Date Noted Date Resolved POA    Chronic kidney disease, stage 3a [N18.31] 06/04/2025 07/14/2025 Yes    Diabetic ophthalmopathy [E11.39] 02/16/2024 07/14/2025 Yes       Discharged Condition: good    Disposition: Home or Self Care    Follow Up:   Follow-up Information       Cayden Morelos MD. Go on 7/29/2025.    Specialty: Family Medicine  Why: appointment Tuesday July 29th at 2:00pm for hospital follow up  Contact information:  [address] [place] [address] [phone]                           Patient Instructions:      Diet Adult Regular     Activity as tolerated       Significant Diagnostic Studies: Labs: CMP   Recent Labs   Lab 07/14/25  0205      K 3.8      CO2 26   GLU 86   BUN 26*   CREATININE 1.1   CALCIUM 9.1   PROT 6.9   ALBUMIN 3.3*   BILITOT 0.6   ALKPHOS 121   AST 37   ALT 24   ANIONGAP 10   , CBC   Recent Labs   Lab 07/14/25  0205   WBC 6.42   HGB 11.3*   HCT 36.0*   *   , and A1C:   Recent Labs   Lab 06/12/25  1300   HGBA1C 6.2*       Pending Diagnostic Studies:       None           Medications:  Reconciled Home Medications:      Medication List        CONTINUE taking these medications      alcohol swabs  Padm  Commonly known as: ALCOHOL PREP  Apply 1 each topically 2 (two) times a day.     blood sugar diagnostic Strp  Commonly known as: TRUE METRIX GLUCOSE TEST STRIP  Inject 1 each into the skin 2 (two) times a day.     blood-glucose meter Misc  Commonly known as: TRUE METRIX AIR GLUCOSE METER  Inject 1 each into the skin once daily.     cyclobenzaprine 10 MG tablet  Commonly known as: FLEXERIL  Take 1 tablet (10 mg total) by mouth every evening.     DULoxetine 30 MG capsule  Commonly known as: CYMBALTA  Take 1 capsule (30 mg total) by mouth once daily.     FREESTYLE CHAPARRITA 3 READER Misc  Generic drug: blood-glucose,,cont  1 each by Misc.(Non-Drug; Combo Route) route every 14 (fourteen) days.     FREESTYLE CHAPARRITA 3 SENSOR Nguyen  Generic drug: blood-glucose sensor  1 Device by Misc.(Non-Drug; Combo Route) route every 14 (fourteen) days.     gabapentin 300 MG capsule  Commonly known as: NEURONTIN  Take 1 capsule (300 mg total) by mouth 2 (two) times daily.     losartan-hydrochlorothiazide 50-12.5 mg 50-12.5 mg per tablet  Commonly known as: HYZAAR  Take 1 tablet by mouth once daily.     oxybutynin 5 MG Tab  Commonly known as: DITROPAN  Take 1 tablet (5 mg total) by mouth 2 (two) times daily.     traMADoL 50 mg tablet  Commonly known as: ULTRAM  Take 1 tablet (50 mg total) by mouth every 6 (six) hours as needed for Pain.     TRUE METRIX LEVEL 1 Soln  Generic drug: blood glucose control, low  Inject 1 each into the skin 2 (two) times a day.     * TRUEPLUS LANCETS 33 gauge Misc  Generic drug: lancets  Apply topically.     * lancets 30 gauge Misc  Commonly known as: TRUEPLUS LANCETS  Inject 1 lancet  into the skin 2 (two) times a day.           * This list has 2 medication(s) that are the same as other medications prescribed for you. Read the directions carefully, and ask your doctor or other care provider to review them with you.                  Indwelling Lines/Drains at time of discharge:   Lines/Drains/Airways        Drain  Duration             Female External Urinary Catheter w/ Suction 07/14/25 0651 <1 day                        Time spent on the discharge of patient: 35 minutes         PACKER MD  Department of Hospital Medicine  [place] - Emergency Dept

## 2025-07-14 NOTE — ED PROVIDER NOTES
History     Chief Complaint   Patient presents with    Hypoglycemia     Discharged a few hours ago.  Pt became generally weak with unsteady gate  Bg 44 ate bg 131 family called ems  bg 339 after d10 250mL     HPI:  Suma Cruz is a 74 y.o. female with PMH as below who presents to the [facility] emergency department for evaluation of recurrent hypoglycemia. I saw her earlier in the ED after she had transient confusion with hypoglycemia and she initially had a sustained recovery and was discharged. After going home, she had recurrence of confusion and unsteady gait with glucose of 44, with symptoms resolved after 250 mL D10 from EMS (with glucose increase to 339). Patient was recently started on glyburide 5 mg.       PCP: Cayden Morelos MD    Review of patient's allergies indicates:   Allergen Reactions    Ciprofloxacin Rash    Levofloxacin Rash    Ace inhibitors Other (See Comments)     Cough    Codeine Other (See Comments) and Nausea And Vomiting    Nickel Rash      Past Medical History:   Diagnosis Date    Diabetes mellitus, type 2     Fibromyalgia     Hypertension     Overactive bladder      No past surgical history on file.    Family History   Problem Relation Name Age of Onset    Macular degeneration Mother      Diverticulitis Mother      Liver cancer Father      Heart disease Sister       Social History     Tobacco Use    Smoking status: Former     Types: Cigarettes     Passive exposure: Never (Smoker in the 70's; no longer)    Smokeless tobacco: Never   Vaping Use    Vaping status: Never Used   Substance and Sexual Activity    Alcohol use: Not Currently    Drug use: Not Currently    Sexual activity: Not on file      Review of Systems     Review of Systems   Constitutional: Negative.    HENT: Negative.     Eyes: Negative.    Respiratory: Negative.     Cardiovascular: Negative.    Gastrointestinal: Negative.    Endocrine: Negative.    Genitourinary: Negative.    Musculoskeletal: Negative.     Skin: Negative.    Allergic/Immunologic: Negative.    Neurological: Negative.    Hematological: Negative.    Psychiatric/Behavioral: Negative.     All other systems reviewed and are negative.       Physical Exam     Initial Vitals   BP Pulse Resp Temp SpO2   07/14/25 0200 07/14/25 0200 07/14/25 0200 07/14/25 0200 07/14/25 0155   136/64 83 18 97.7 °F (36.5 °C) 96 %      MAP       --                 Nursing notes and vital signs reviewed.  Constitutional: Patient is in no acute distress.   Head: Normocephalic. Atraumatic.   Eyes:  Conjunctivae are not pale. No scleral icterus.   ENT: Mucous membranes moist.   Neck: Supple.   Cardiovascular: Regular rate. Regular rhythm. Systolic murmur.   Pulmonary: No respiratory distress. RLL crackles.   Abdominal: Non-distended.   Musculoskeletal: Moves all extremities. No obvious deformities.   Skin: Warm and dry.   Neurological:  Alert, awake, and appropriate. Normal speech. No acute lateralizing neurologic deficits appreciated.   Psychiatric: Normal affect.       ED Course   Critical Care    Date/Time: 7/14/2025 2:51 AM    Performed by: Douglas May MD  Authorized by: Douglas May MD  Direct patient critical care time: 15 minutes  Additional history critical care time: 5 minutes  Ordering / reviewing critical care time: 5 minutes  Documentation critical care time: 5 minutes  Consulting other physicians critical care time: 5 minutes  Total critical care time (exclusive of procedural time) : 35 minutes  Critical care time was exclusive of separately billable procedures and treating other patients and teaching time.  Critical care was necessary to treat or prevent imminent or life-threatening deterioration of the following conditions: endocrine crisis (refractory hypoglycemia on sulfonylurea).  Critical care was time spent personally by me on the following activities: blood draw for specimens, development of treatment plan with patient or surrogate, interpretation  of cardiac output measurements, evaluation of patient's response to treatment, examination of patient, obtaining history from patient or surrogate, ordering and performing treatments and interventions, ordering and review of laboratory studies, ordering and review of radiographic studies, pulse oximetry, re-evaluation of patient's condition, review of old charts and discussions with consultants.        Vitals:    07/14/25 0155 07/14/25 0200   BP:  136/64   Pulse:  83   Resp:  18   Temp:  97.7 °F (36.5 °C)   SpO2: 96% 100%     Lab Results Interpreted as Abnormal:  Labs Reviewed   COMPREHENSIVE METABOLIC PANEL - Abnormal       Result Value    Sodium 137      Potassium 3.8      Chloride 101      CO2 26      Glucose 86      BUN 26 (*)     Creatinine 1.1      Calcium 9.1      Protein Total 6.9      Albumin 3.3 (*)     Bilirubin Total 0.6            AST 37      ALT 24      Anion Gap 10      eGFR 53 (*)    CBC WITH DIFFERENTIAL - Abnormal    WBC 6.42      RBC 4.08      HGB 11.3 (*)     HCT 36.0 (*)     MCV 88      MCH 27.7      MCHC 31.4 (*)     RDW 14.6 (*)     Platelet Count 148 (*)     MPV 10.9      Nucleated RBC 0      Neut % 71.8      Lymph % 13.7 (*)     Mono % 11.1      Eos % 2.6      Basophil % 0.5      Imm Grans % 0.3      Neut # 4.61      Lymph # 0.88 (*)     Mono # 0.71      Eos # 0.17      Baso # 0.03      Imm Grans # 0.02     POCT GLUCOSE - Abnormal    POCT Glucose 122 (*)    CBC W/ AUTO DIFFERENTIAL    Narrative:     The following orders were created for panel order CBC auto differential.  Procedure                               Abnormality         Status                     ---------                               -----------         ------                     CBC with Differential[[id number]]       Abnormal            Final result                 Please view results for these tests on the individual orders.      All Lab Results:  Results for orders placed or performed during the hospital encounter of  07/14/25   POCT glucose    Collection Time: 07/14/25  1:57 AM   Result Value Ref Range    POCT Glucose 122 (H) 70 - 110 mg/dL   Comprehensive metabolic panel    Collection Time: 07/14/25  2:05 AM   Result Value Ref Range    Sodium 137 136 - 145 mmol/L    Potassium 3.8 3.5 - 5.1 mmol/L    Chloride 101 95 - 110 mmol/L    CO2 26 23 - 29 mmol/L    Glucose 86 70 - 110 mg/dL    BUN 26 (H) 8 - 23 mg/dL    Creatinine 1.1 0.5 - 1.4 mg/dL    Calcium 9.1 8.7 - 10.5 mg/dL    Protein Total 6.9 6.0 - 8.4 gm/dL    Albumin 3.3 (L) 3.5 - 5.2 g/dL    Bilirubin Total 0.6 0.1 - 1.0 mg/dL     40 - 150 unit/L    AST 37 11 - 45 unit/L    ALT 24 10 - 44 unit/L    Anion Gap 10 8 - 16 mmol/L    eGFR 53 (L) >60 mL/min/1.73/m2   CBC with Differential    Collection Time: 07/14/25  2:05 AM   Result Value Ref Range    WBC 6.42 3.90 - 12.70 K/uL    RBC 4.08 4.00 - 5.40 M/uL    HGB 11.3 (L) 12.0 - 16.0 gm/dL    HCT 36.0 (L) 37.0 - 48.5 %    MCV 88 82 - 98 fL    MCH 27.7 27.0 - 31.0 pg    MCHC 31.4 (L) 32.0 - 36.0 g/dL    RDW 14.6 (H) 11.5 - 14.5 %    Platelet Count 148 (L) 150 - 450 K/uL    MPV 10.9 9.2 - 12.9 fL    Nucleated RBC 0 <=0 /100 WBC    Neut % 71.8 38 - 73 %    Lymph % 13.7 (L) 18 - 48 %    Mono % 11.1 4 - 15 %    Eos % 2.6 <=8 %    Basophil % 0.5 <=1.9 %    Imm Grans % 0.3 0.0 - 0.5 %    Neut # 4.61 1.8 - 7.7 K/uL    Lymph # 0.88 (L) 1 - 4.8 K/uL    Mono # 0.71 0.3 - 1 K/uL    Eos # 0.17 <=0.5 K/uL    Baso # 0.03 <=0.2 K/uL    Imm Grans # 0.02 0.00 - 0.04 K/uL     Imaging Results              X-Ray Chest AP Portable (Final result)  Result time 07/14/25 03:18:41      Final result by Adalid Jimenez MD (07/14/25 03:18:41)                   Impression:      No acute findings in the chest.      Electronically signed by: Adalid Jimenez MD  Date:    07/14/2025  Time:    03:18               Narrative:    EXAMINATION:  XR CHEST AP PORTABLE    CLINICAL HISTORY:  Hypoglycemia, unspecified    TECHNIQUE:  Single frontal view of the  chest was performed.    COMPARISON:  07/08/2025.    FINDINGS:  No consolidation, pleural effusion or pneumothorax.    Cardiomediastinal silhouette is unremarkable.                                     The emergency physician reviewed the vital signs / test results outlined above.     ED Discussion      I discussed the patient's case with Dr. Rasheed, \[facility]\"" medicine, who accepts the patient for admission.     Admitting physician: Dr. Rasheed  Admitting service:  Hospital Medicine       ED Medication(s) Administered:  Medications   dextrose 10 % infusion ( Intravenous New Bag 7/14/25 0208)   octreotide injection 100 mcg (100 mcg Intravenous Given 7/14/25 0206)       Prescription Management: I performed a review of the patient's current Rx medication list as input by nursing staff.    Patient's Medications   New Prescriptions    No medications on file   Previous Medications    ALCOHOL SWABS (BD ALCOHOL SWABS) PADM    Apply 1 each topically 2 (two) times a day.    BLOOD GLUCOSE CONTROL, LOW (TRUE METRIX LEVEL 1) SOLN    Inject 1 each into the skin 2 (two) times a day.    BLOOD SUGAR DIAGNOSTIC (TRUE METRIX GLUCOSE TEST STRIP) STRP    Inject 1 each into the skin 2 (two) times a day.    BLOOD-GLUCOSE METER (TRUE METRIX AIR GLUCOSE METER) MISC    Inject 1 each into the skin once daily.    BLOOD-GLUCOSE SENSOR (FREESTYLE CHAPARRITA 3 SENSOR) SERA    1 Device by Misc.(Non-Drug; Combo Route) route every 14 (fourteen) days.    BLOOD-GLUCOSE,,CONT (FREESTYLE CHAPARRITA 3 READER) MISC    1 each by Misc.(Non-Drug; Combo Route) route every 14 (fourteen) days.    CYCLOBENZAPRINE (FLEXERIL) 10 MG TABLET    Take 1 tablet (10 mg total) by mouth every evening.    DULOXETINE (CYMBALTA) 30 MG CAPSULE    Take 1 capsule (30 mg total) by mouth once daily.    GABAPENTIN (NEURONTIN) 300 MG CAPSULE    Take 1 capsule (300 mg total) by mouth 2 (two) times daily.    GLYBURIDE (DIABETA) 5 MG TABLET    Take 1 tablet (5 mg total) by mouth daily with  breakfast.    LANCETS (TRUEPLUS LANCETS) 30 GAUGE MISC    Inject 1 lancet  into the skin 2 (two) times a day.    LOSARTAN-HYDROCHLOROTHIAZIDE 50-12.5 MG (HYZAAR) 50-12.5 MG PER TABLET    Take 1 tablet by mouth once daily.    OXYBUTYNIN (DITROPAN) 5 MG TAB    Take 1 tablet (5 mg total) by mouth 2 (two) times daily.    TRAMADOL (ULTRAM) 50 MG TABLET    Take 1 tablet (50 mg total) by mouth every 6 (six) hours as needed for Pain.    TRUEPLUS LANCETS 33 GAUGE MISC    Apply topically.   Modified Medications    No medications on file   Discontinued Medications    No medications on file           Clinical Impression       ICD-10-CM ICD-9-CM   1. Hypoglycemia secondary to sulfonylurea, accidental or unintentional, initial encounter  T38.3X1A 962.3    E16.0 E858.8   2. Hypoglycemia  E16.2 251.2      ED Disposition Condition    Admit              Douglas May MD  07/14/25 9261

## 2025-07-14 NOTE — ASSESSMENT & PLAN NOTE
Creatine stable for now. BMP reviewed- noted Estimated Creatinine Clearance: 40.8 mL/min (based on SCr of 1.1 mg/dL). according to latest data. Based on current GFR, CKD stage is stage 3 - GFR 30-59.  Monitor UOP and serial BMP and adjust therapy as needed. Renally dose meds. Avoid nephrotoxic medications and procedures.   Equal and normal pulses (carotid, femoral, dorsalis pedis)

## 2025-07-14 NOTE — PLAN OF CARE
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 She is independent at home. She denies using any medical equipment for ambulation but does use her nurse's shoes for good support. She uses Dr Morelos for PCP. She has a glucometer ordered that should be coming in soon. She uses [facility] for prescriptions. Her niece will provide transportation home.She states her niece can be notified but to not let her niece's boyfriend know any information. Nurse notified.     Payor: [employer] MEDICARE / Plan: [employer] PPO SPECIAL NEEDS / Product Type: Medicare Advantage /     Extended Emergency Contact Information  Primary Emergency Contact: Marisa Cui  Mobile Phone: [phone]  Relation: Relative  Preferred language: English   needed? No    Discharge Plan A: Home with family  Discharge Plan B: Home      [facility] - [facility] [address] [facility]  [address] [facility] [address]  Phone: [phone] Fax: [phone]    [id number] [place] [address] AT [facility] [address]  [address] [address]  Phone: [phone] Fax: [phone]      Initial Assessment (most recent)       Adult Discharge Assessment - 07/14/25 1128          Discharge Assessment    Assessment Type Discharge Planning Assessment     Confirmed/corrected address, phone number and insurance Yes     Confirmed Demographics Correct on Facesheet     Source of Information patient     Communicated SARAHI with patient/caregiver Yes     People in Home other relative(s)     Name(s) of People in Home Marisa See delmi [phone]      Do you expect to return to your current living situation? Yes     Do you have help at home or someone to help you manage your care at home? Yes     Who are your caregiver(s) and their phone number(s)? Marisa awad [phone]     Prior to hospitilization cognitive status: Alert/Oriented     Current cognitive status: Alert/Oriented     Walking or Climbing Stairs Difficulty no     Dressing/Bathing Difficulty no     Home Layout Able to live on 1st floor     Equipment Currently Used at Home none     Readmission within 30 days? No     Patient currently being followed by outpatient case management? No     Do you currently have service(s) that help you manage your care at home? No     Do you take prescription medications? Yes     Do you have prescription coverage? Yes     Do you have any problems affording any of your prescribed medications? No     Is the patient taking medications as prescribed? yes     Who is going to help you get home at discharge? Marisa awad [phone]     How do you get to doctors appointments? family or friend will provide     Are you on dialysis? No     Do you take coumadin? No     Discharge Plan A Home with family     Discharge Plan B Home     DME Needed Upon Discharge  none     Discharge Plan discussed with: Patient     Transition of Care Barriers None        Physical Activity    On average, how many days per week do you engage in moderate to strenuous exercise (like a brisk walk)? 7 days     On average, how many minutes do you engage in exercise at this level? 20 min        Financial Resource Strain    How hard is it for you to pay for the very basics like food, housing, medical care, and heating? Not hard at all        Housing Stability    In the last 12 months, was there a time when you were not able to pay the mortgage or rent on time? No     At any time in the past 12 months, were you homeless or living in a shelter (including now)? No        Transportation Needs    In the  past 12 months, has lack of transportation kept you from medical appointments or from getting medications? No     In the past 12 months, has lack of transportation kept you from meetings, work, or from getting things needed for daily living? No        Food Insecurity    Within the past 12 months, you worried that your food would run out before you got the money to buy more. Never true     Within the past 12 months, the food you bought just didn't last and you didn't have money to get more. Never true        Stress    Do you feel stress - tense, restless, nervous, or anxious, or unable to sleep at night because your mind is troubled all the time - these days? Not at all        Social Isolation    How often do you feel lonely or isolated from those around you?  Never        Alcohol Use    Q1: How often do you have a drink containing alcohol? Never     Q2: How many drinks containing alcohol do you have on a typical day when you are drinking? Patient does not drink     Q3: How often do you have six or more drinks on one occasion? Never        Utilities    In the past 12 months has the electric, gas, oil, or water company threatened to shut off services in your home? No        Health Literacy    How often do you need to have someone help you when you read instructions, pamphlets, or other written material from your doctor or pharmacy? Never

## 2025-07-14 NOTE — SUBJECTIVE & OBJECTIVE
Past Medical History:   Diagnosis Date    Diabetes mellitus, type 2     Fibromyalgia     Hypertension     Overactive bladder        History reviewed. No pertinent surgical history.    Review of patient's allergies indicates:   Allergen Reactions    Ciprofloxacin Rash    Levofloxacin Rash    Ace inhibitors Other (See Comments)     Cough    Codeine Other (See Comments) and Nausea And Vomiting    Nickel Rash       No current facility-administered medications on file prior to encounter.     Current Outpatient Medications on File Prior to Encounter   Medication Sig    alcohol swabs (BD ALCOHOL SWABS) PadM Apply 1 each topically 2 (two) times a day.    blood glucose control, low (TRUE METRIX LEVEL 1) Soln Inject 1 each into the skin 2 (two) times a day.    blood sugar diagnostic (TRUE METRIX GLUCOSE TEST STRIP) Strp Inject 1 each into the skin 2 (two) times a day.    blood-glucose meter (TRUE METRIX AIR GLUCOSE METER) Misc Inject 1 each into the skin once daily.    blood-glucose sensor (FREESTYLE CHAPARRITA 3 SENSOR) Nguyen 1 Device by Misc.(Non-Drug; Combo Route) route every 14 (fourteen) days.    blood-glucose,,cont (FREESTYLE CHAPARRITA 3 READER) Misc 1 each by Misc.(Non-Drug; Combo Route) route every 14 (fourteen) days.    cyclobenzaprine (FLEXERIL) 10 MG tablet Take 1 tablet (10 mg total) by mouth every evening.    DULoxetine (CYMBALTA) 30 MG capsule Take 1 capsule (30 mg total) by mouth once daily.    gabapentin (NEURONTIN) 300 MG capsule Take 1 capsule (300 mg total) by mouth 2 (two) times daily.    lancets (TRUEPLUS LANCETS) 30 gauge Misc Inject 1 lancet  into the skin 2 (two) times a day.    losartan-hydrochlorothiazide 50-12.5 mg (HYZAAR) 50-12.5 mg per tablet Take 1 tablet by mouth once daily.    oxybutynin (DITROPAN) 5 MG Tab Take 1 tablet (5 mg total) by mouth 2 (two) times daily.    traMADoL (ULTRAM) 50 mg tablet Take 1 tablet (50 mg total) by mouth every 6 (six) hours as needed for Pain.    TRUEPLUS LANCETS 33  gauge Misc Apply topically.    [DISCONTINUED] glyBURIDE (DIABETA) 5 MG tablet Take 1 tablet (5 mg total) by mouth daily with breakfast.     Family History       Problem Relation (Age of Onset)    Diverticulitis Mother    Heart disease Sister    Liver cancer Father    Macular degeneration Mother          Tobacco Use    Smoking status: Former     Types: Cigarettes     Passive exposure: Never (Smoker in the 70's; no longer)    Smokeless tobacco: Never   Vaping Use    Vaping status: Never Used   Substance and Sexual Activity    Alcohol use: Not Currently    Drug use: Not Currently    Sexual activity: Not on file     Review of Systems   Constitutional:  Negative for appetite change and fever.   Respiratory:  Negative for cough and shortness of breath.    Cardiovascular:  Negative for chest pain and palpitations.   Gastrointestinal:  Negative for abdominal pain, constipation, diarrhea, nausea and vomiting.   Genitourinary:  Negative for difficulty urinating and dysuria.   Musculoskeletal:  Positive for back pain and gait problem. Negative for arthralgias.   Neurological:  Negative for dizziness and light-headedness.   Psychiatric/Behavioral:  Positive for confusion. Negative for sleep disturbance. The patient is not nervous/anxious.      Objective:     Vital Signs (Most Recent):  Temp: 97.7 °F (36.5 °C) (07/14/25 0200)  Pulse: 82 (07/14/25 0532)  Resp: 18 (07/14/25 0200)  BP: (!) 152/69 (07/14/25 0532)  SpO2: 96 % (07/14/25 0532) Vital Signs (24h Range):  Temp:  [97.7 °F (36.5 °C)] 97.7 °F (36.5 °C)  Pulse:  [76-95] 82  Resp:  [18-24] 18  SpO2:  [96 %-100 %] 96 %  BP: (111-152)/(53-69) 152/69        There is no height or weight on file to calculate BMI.     Physical Exam  Vitals and nursing note reviewed.   Constitutional:       General: She is not in acute distress.     Appearance: She is well-developed.   Cardiovascular:      Rate and Rhythm: Normal rate and regular rhythm.   Pulmonary:      Effort: Pulmonary effort is  normal. No respiratory distress.   Neurological:      Mental Status: She is alert and oriented to person, place, and time.   Psychiatric:         Mood and Affect: Mood normal.         Behavior: Behavior normal. Behavior is cooperative.         Thought Content: Thought content normal.             Significant Labs:   A1C:   Recent Labs   Lab 06/12/25  1300   HGBA1C 6.2*     CBC:   Recent Labs   Lab 07/14/25  0205   WBC 6.42   HGB 11.3*   HCT 36.0*   *     CMP:   Recent Labs   Lab 07/14/25  0205      K 3.8      CO2 26   GLU 86   BUN 26*   CREATININE 1.1   CALCIUM 9.1   PROT 6.9   ALBUMIN 3.3*   BILITOT 0.6   ALKPHOS 121   AST 37   ALT 24   ANIONGAP 10     POCT Glucose:   Recent Labs   Lab 07/14/25  0157 07/14/25  0245 07/14/25  0359   POCTGLUCOSE 122* 75 181*     Urine Studies:   Recent Labs   Lab 07/13/25  1928   COLORU Yellow   APPEARANCEUA Clear   PHUR 7.0   SPECGRAV 1.015   PROTEINUA Negative   GLUCUA Negative   BILIRUBINUA Negative   OCCULTUA Negative   NITRITE Negative   UROBILINOGEN 2.0-3.0*   LEUKOCYTESUR Negative       Significant Imaging: I have reviewed all pertinent imaging results/findings within the past 24 hours.

## 2025-07-14 NOTE — PLAN OF CARE
[facility] - Emergency Dept  Discharge Final Note    Primary Care Provider: Cayden Morelos MD    Expected Discharge Date: 7/16/2025    Plan to discharge home today. Provided her with follow up appointment with Dr Morelos on July 28th at 9:00am; she prefers different time. Next available is July 29th at 2:00pm. She is good with that day. OK for DC from CM standpoint once MD is finished with discharge.        Final Discharge Note (most recent)       Final Note - 07/14/25 1143          Final Note    Assessment Type Final Discharge Note     Anticipated Discharge Disposition Home or Self Care     What phone number can be called within the next 1-3 days to see how you are doing after discharge? [phone]     Hospital Resources/Appts/Education Provided Appointments scheduled and added to AVS        Post-Acute Status    Discharge Delays None known at this time                     Important Message from Medicare             Contact Info       Cayden Morelos MD   Specialty: Family Medicine   Relationship: PCP - General    [address] [place] [address]   Phone: [phone]       Next Steps: Go on 7/29/2025    Instructions: appointment Tuesday July 29th at 2:00pm for hospital follow up

## 2025-07-14 NOTE — H&P
[facility] Emergency [facility]  [facility] Medicine  History & Physical    Patient Name: Suma Cruz  MRN: [id number]  Admission Date: 7/14/2025  Attending Physician: Ronald Gee MD   Primary Care Provider: Cayden Morelos MD         Patient information was obtained from patient, past medical records, and ER records.       Subjective:     Principal Problem:Hypoglycemia associated with type 2 diabetes mellitus    Chief Complaint:   Chief Complaint   Patient presents with    Hypoglycemia     Discharged a few hours ago.  Pt became generally weak with unsteady gate  Bg 44 ate bg 131 family called ems  bg 339 after d10 250mL        HPI: Ms. Cruz is a 75 yo female with DM2, HTN, and fibromyalgia who presented to [facility] ED for confusion and hypoglycemia. This is her second visit in this 24 hour period for the same thing. She was tolerating a diet and blood sugar was stable in the normal range when discharged home earlier in the day. This evening she returns with confusion, difficulty walking and her blood sugar was found to be 44. She only takes glyburide 5 mg daily and last took a dose the morning of 7/13. She says that she is eating well and has not changed her eating habits. She was started on the glyburide in June 2025 as her blood sugar was running in the 200s prior. She checks her sugars twice a day at home and it has been in the 100s or lower. In the ED this time, she was given a dose of octreotide and started on D10 infusion. She is currently doing well and has not particular complaints.     Past Medical History:   Diagnosis Date    Diabetes mellitus, type 2     Fibromyalgia     Hypertension     Overactive bladder        History reviewed. No pertinent surgical history.    Review of patient's allergies indicates:   Allergen Reactions    Ciprofloxacin Rash    Levofloxacin Rash    Ace inhibitors Other (See Comments)     Cough    Codeine Other (See Comments) and Nausea And Vomiting    Nickel Rash       No  current facility-administered medications on file prior to encounter.     Current Outpatient Medications on File Prior to Encounter   Medication Sig    alcohol swabs (BD ALCOHOL SWABS) PadM Apply 1 each topically 2 (two) times a day.    blood glucose control, low (TRUE METRIX LEVEL 1) Soln Inject 1 each into the skin 2 (two) times a day.    blood sugar diagnostic (TRUE METRIX GLUCOSE TEST STRIP) Strp Inject 1 each into the skin 2 (two) times a day.    blood-glucose meter (TRUE METRIX AIR GLUCOSE METER) Misc Inject 1 each into the skin once daily.    blood-glucose sensor (FREESTYLE CHAPARRITA 3 SENSOR) Nguyen 1 Device by Misc.(Non-Drug; Combo Route) route every 14 (fourteen) days.    blood-glucose,,cont (FREESTYLE CHAPARRITA 3 READER) Misc 1 each by Misc.(Non-Drug; Combo Route) route every 14 (fourteen) days.    cyclobenzaprine (FLEXERIL) 10 MG tablet Take 1 tablet (10 mg total) by mouth every evening.    DULoxetine (CYMBALTA) 30 MG capsule Take 1 capsule (30 mg total) by mouth once daily.    gabapentin (NEURONTIN) 300 MG capsule Take 1 capsule (300 mg total) by mouth 2 (two) times daily.    lancets (TRUEPLUS LANCETS) 30 gauge Misc Inject 1 lancet  into the skin 2 (two) times a day.    losartan-hydrochlorothiazide 50-12.5 mg (HYZAAR) 50-12.5 mg per tablet Take 1 tablet by mouth once daily.    oxybutynin (DITROPAN) 5 MG Tab Take 1 tablet (5 mg total) by mouth 2 (two) times daily.    traMADoL (ULTRAM) 50 mg tablet Take 1 tablet (50 mg total) by mouth every 6 (six) hours as needed for Pain.    TRUEPLUS LANCETS 33 gauge Misc Apply topically.    [DISCONTINUED] glyBURIDE (DIABETA) 5 MG tablet Take 1 tablet (5 mg total) by mouth daily with breakfast.     Family History       Problem Relation (Age of Onset)    Diverticulitis Mother    Heart disease Sister    Liver cancer Father    Macular degeneration Mother          Tobacco Use    Smoking status: Former     Types: Cigarettes     Passive exposure: Never (Smoker in the 70's; no  longer)    Smokeless tobacco: Never   Vaping Use    Vaping status: Never Used   Substance and Sexual Activity    Alcohol use: Not Currently    Drug use: Not Currently    Sexual activity: Not on file     Review of Systems   Constitutional:  Negative for appetite change and fever.   Respiratory:  Negative for cough and shortness of breath.    Cardiovascular:  Negative for chest pain and palpitations.   Gastrointestinal:  Negative for abdominal pain, constipation, diarrhea, nausea and vomiting.   Genitourinary:  Negative for difficulty urinating and dysuria.   Musculoskeletal:  Positive for back pain and gait problem. Negative for arthralgias.   Neurological:  Negative for dizziness and light-headedness.   Psychiatric/Behavioral:  Positive for confusion. Negative for sleep disturbance. The patient is not nervous/anxious.      Objective:     Vital Signs (Most Recent):  Temp: 97.7 °F (36.5 °C) (07/14/25 0200)  Pulse: 82 (07/14/25 0532)  Resp: 18 (07/14/25 0200)  BP: (!) 152/69 (07/14/25 0532)  SpO2: 96 % (07/14/25 0532) Vital Signs (24h Range):  Temp:  [97.7 °F (36.5 °C)] 97.7 °F (36.5 °C)  Pulse:  [76-95] 82  Resp:  [18-24] 18  SpO2:  [96 %-100 %] 96 %  BP: (111-152)/(53-69) 152/69        There is no height or weight on file to calculate BMI.     Physical Exam  Vitals and nursing note reviewed.   Constitutional:       General: She is not in acute distress.     Appearance: She is well-developed.   Cardiovascular:      Rate and Rhythm: Normal rate and regular rhythm.   Pulmonary:      Effort: Pulmonary effort is normal. No respiratory distress.   Neurological:      Mental Status: She is alert and oriented to person, place, and time.   Psychiatric:         Mood and Affect: Mood normal.         Behavior: Behavior normal. Behavior is cooperative.         Thought Content: Thought content normal.             Significant Labs:   A1C:   Recent Labs   Lab 06/12/25  1300   HGBA1C 6.2*     CBC:   Recent Labs   Lab 07/14/25  0205    WBC 6.42   HGB 11.3*   HCT 36.0*   *     CMP:   Recent Labs   Lab 07/14/25  0205      K 3.8      CO2 26   GLU 86   BUN 26*   CREATININE 1.1   CALCIUM 9.1   PROT 6.9   ALBUMIN 3.3*   BILITOT 0.6   ALKPHOS 121   AST 37   ALT 24   ANIONGAP 10     POCT Glucose:   Recent Labs   Lab 07/14/25  0157 07/14/25  0245 07/14/25  0359   POCTGLUCOSE 122* 75 181*     Urine Studies:   Recent Labs   Lab 07/13/25  1928   COLORU Yellow   APPEARANCEUA Clear   PHUR 7.0   SPECGRAV 1.015   PROTEINUA Negative   GLUCUA Negative   BILIRUBINUA Negative   OCCULTUA Negative   NITRITE Negative   UROBILINOGEN 2.0-3.0*   LEUKOCYTESUR Negative       Significant Imaging: I have reviewed all pertinent imaging results/findings within the past 24 hours.  Assessment/Plan:     Assessment & Plan  Hypoglycemia associated with type 2 diabetes mellitus  Diabetic ophthalmopathy  Patient's FSGs are uncontrolled due to hypoglycemia on current medication regimen.  Last A1c reviewed-   Lab Results   Component Value Date    HGBA1C 6.2 (H) 06/12/2025     Most recent fingerstick glucose reviewed-   Recent Labs   Lab 07/14/25  0157 07/14/25  0245 07/14/25  0359 07/14/25  0509   POCTGLUCOSE 122* 75 181* 289*     Current correctional scale  Low  Decrease anti-hyperglycemic dose as follows- Stop here home glyburide.   Antihyperglycemics (From admission, onward)      Start     Stop Route Frequency Ordered    07/14/25 0627  insulin aspart U-100 pen 0-5 Units         -- SubQ Before meals & nightly PRN 07/14/25 0538          Hold Oral hypoglycemics while patient is in the hospital.  Continue D10 at 40 mL/hr for now.   Encourage oral diet.   Accuchecks q4 hrs.   Anemia of chronic disease  Anemia is likely due to chronic disease due to Chronic Kidney Disease. Most recent hemoglobin and hematocrit are listed below.  Recent Labs     07/14/25 0205   HGB 11.3*   HCT 36.0*     Plan  - Will not monitor CBC during this admission as it is stable.   - Transfuse  PRBC if patient becomes hemodynamically unstable, symptomatic or H/H drops below 7/21.  - Patient has not received any PRBC transfusions to date  - Patient's anemia is currently stable  Diabetic neuropathy  Continue home gabapentin 300 mg TID  Type 2 diabetes mellitus with stage 3 chronic kidney disease, without long-term current use of insulin  Creatine stable for now. BMP reviewed- noted CrCl cannot be calculated (Unknown ideal weight.). according to latest data. Based on current GFR, CKD stage is stage 3 - GFR 30-59.  Monitor UOP and serial BMP and adjust therapy as needed. Renally dose meds. Avoid nephrotoxic medications and procedures.  Fibromyalgia  Continue her home duloxetine 30 mg daily, cyclobenzaprine 10 mg qHS PRN and tramadol PRN.     Chronic kidney disease, stage 3a  Creatine stable for now. BMP reviewed- noted CrCl cannot be calculated (Unknown ideal weight.). according to latest data. Based on current GFR, CKD stage is stage 3 - GFR 30-59.  Monitor UOP and serial BMP and adjust therapy as needed. Renally dose meds. Avoid nephrotoxic medications and procedures.  Overactive bladder  Continue her home oxybutynin 5 mg BID.     VTE Risk Mitigation (From admission, onward)           Ordered     enoxaparin injection 40 mg  Daily         07/14/25 0538     IP VTE HIGH RISK PATIENT  Once         07/14/25 0538     Place sequential compression device  Until discontinued         07/14/25 0538                    The attending portion of this evaluation, treatment, and documentation was performed per Robi Rasheed MD via Telemedicine AudioVisual using the secure [employer] software platform with 2 way audio/video. The provider was located off-site and the patient is located in the hospital. The aforementioned video software was utilized to document the relevant history and physical exam    On 07/14/2025, patient should be placed in hospital observation services under my care.    Robi Rasheed MD  Department of  [facility] Medicine   [place] - Emergency Dept

## 2025-07-14 NOTE — ASSESSMENT & PLAN NOTE
Creatine stable for now. BMP reviewed- noted Estimated Creatinine Clearance: 40.8 mL/min (based on SCr of 1.1 mg/dL). according to latest data. Based on current GFR, CKD stage is stage 3 - GFR 30-59.  Monitor UOP and serial BMP and adjust therapy as needed. Renally dose meds. Avoid nephrotoxic medications and procedures.

## 2025-07-14 NOTE — HPI
Ms. Cruz is a 75 yo female with DM2, HTN, and fibromyalgia who presented to [place] ED for confusion and hypoglycemia. This is her second visit in this 24 hour period for the same thing. She was tolerating a diet and blood sugar was stable in the normal range when discharged home earlier in the day. This evening she returns with confusion, difficulty walking and her blood sugar was found to be 44. She only takes glyburide 5 mg daily and last took a dose the morning of 7/13. She says that she is eating well and has not changed her eating habits. She was started on the glyburide in June 2025 as her blood sugar was running in the 200s prior. She checks her sugars twice a day at home and it has been in the 100s or lower. In the ED this time, she was given a dose of octreotide and started on D10 infusion. She is currently doing well and has not particular complaints.

## 2025-07-14 NOTE — HOSPITAL COURSE
Ms. Cruz is a 73 yo female with DM2, HTN, and fibromyalgia who presented to [facility] for confusion and hypoglycemia. This is her second visit in this 24 hour period for the same thing. She was tolerating a diet and blood sugar was stable in the normal range when discharged home earlier in the day. This evening she returns with confusion, difficulty walking and her blood sugar was found to be 44. She only takes glyburide 5 mg daily and last took a dose the morning of 7/13. She says that she is eating well and has not changed her eating habits. She was started on the glyburide in June 2025 as her blood sugar was running in the 200s prior. She checks her sugars twice a day at home and it has been in the 100s or lower. In the ED this time, she was given a dose of octreotide and started on D10 infusion. She is currently doing well and has no other complaints.   Pt's BG has been >300, so she is being discharged today. Pt will follow up with her PCP. She was instructed not to take Glyburide anymore but to continue to check her BG BID, write them down, and present them to her PCP for any medication adjustment. Pt has no complaints and is stable. Her VS are stable and she feels that she is at her baseline.

## 2025-07-15 ENCOUNTER — PATIENT OUTREACH (OUTPATIENT)
Dept: ADMINISTRATIVE | Facility: CLINIC | Age: 75
End: 2025-07-15
Payer: MEDICARE

## 2025-07-15 ENCOUNTER — TELEPHONE (OUTPATIENT)
Dept: FAMILY MEDICINE | Facility: CLINIC | Age: 75
End: 2025-07-15
Payer: MEDICARE

## 2025-07-21 ENCOUNTER — PATIENT OUTREACH (OUTPATIENT)
Dept: ADMINISTRATIVE | Facility: HOSPITAL | Age: 75
End: 2025-07-21
Payer: MEDICARE

## 2025-07-28 ENCOUNTER — DOCUMENTATION ONLY (OUTPATIENT)
Dept: ADMINISTRATIVE | Facility: HOSPITAL | Age: 75
End: 2025-07-28
Payer: MEDICARE

## 2025-09-04 ENCOUNTER — OFFICE VISIT (OUTPATIENT)
Dept: FAMILY MEDICINE | Facility: CLINIC | Age: 75
End: 2025-09-04
Payer: MEDICARE

## 2025-09-04 VITALS
DIASTOLIC BLOOD PRESSURE: 66 MMHG | BODY MASS INDEX: 25.52 KG/M2 | WEIGHT: 144 LBS | HEIGHT: 63 IN | OXYGEN SATURATION: 97 % | SYSTOLIC BLOOD PRESSURE: 132 MMHG | HEART RATE: 84 BPM | RESPIRATION RATE: 18 BRPM

## 2025-09-04 DIAGNOSIS — M79.7 FIBROMYALGIA: ICD-10-CM

## 2025-09-04 DIAGNOSIS — E11.22 TYPE 2 DIABETES MELLITUS WITH STAGE 3A CHRONIC KIDNEY DISEASE, WITHOUT LONG-TERM CURRENT USE OF INSULIN: Primary | ICD-10-CM

## 2025-09-04 DIAGNOSIS — F41.9 ANXIETY: ICD-10-CM

## 2025-09-04 DIAGNOSIS — N18.31 TYPE 2 DIABETES MELLITUS WITH STAGE 3A CHRONIC KIDNEY DISEASE, WITHOUT LONG-TERM CURRENT USE OF INSULIN: Primary | ICD-10-CM

## 2025-09-04 PROCEDURE — 3078F DIAST BP <80 MM HG: CPT | Mod: CPTII,S$GLB,, | Performed by: STUDENT IN AN ORGANIZED HEALTH CARE EDUCATION/TRAINING PROGRAM

## 2025-09-04 PROCEDURE — 3075F SYST BP GE 130 - 139MM HG: CPT | Mod: CPTII,S$GLB,, | Performed by: STUDENT IN AN ORGANIZED HEALTH CARE EDUCATION/TRAINING PROGRAM

## 2025-09-04 PROCEDURE — 3060F POS MICROALBUMINURIA REV: CPT | Mod: CPTII,S$GLB,, | Performed by: STUDENT IN AN ORGANIZED HEALTH CARE EDUCATION/TRAINING PROGRAM

## 2025-09-04 PROCEDURE — 3044F HG A1C LEVEL LT 7.0%: CPT | Mod: CPTII,S$GLB,, | Performed by: STUDENT IN AN ORGANIZED HEALTH CARE EDUCATION/TRAINING PROGRAM

## 2025-09-04 PROCEDURE — 1101F PT FALLS ASSESS-DOCD LE1/YR: CPT | Mod: CPTII,S$GLB,, | Performed by: STUDENT IN AN ORGANIZED HEALTH CARE EDUCATION/TRAINING PROGRAM

## 2025-09-04 PROCEDURE — 99999 PR PBB SHADOW E&M-EST. PATIENT-LVL V: CPT | Mod: PBBFAC,,, | Performed by: STUDENT IN AN ORGANIZED HEALTH CARE EDUCATION/TRAINING PROGRAM

## 2025-09-04 PROCEDURE — 3066F NEPHROPATHY DOC TX: CPT | Mod: CPTII,S$GLB,, | Performed by: STUDENT IN AN ORGANIZED HEALTH CARE EDUCATION/TRAINING PROGRAM

## 2025-09-04 PROCEDURE — 3288F FALL RISK ASSESSMENT DOCD: CPT | Mod: CPTII,S$GLB,, | Performed by: STUDENT IN AN ORGANIZED HEALTH CARE EDUCATION/TRAINING PROGRAM

## 2025-09-04 PROCEDURE — 3008F BODY MASS INDEX DOCD: CPT | Mod: CPTII,S$GLB,, | Performed by: STUDENT IN AN ORGANIZED HEALTH CARE EDUCATION/TRAINING PROGRAM

## 2025-09-04 PROCEDURE — 99214 OFFICE O/P EST MOD 30 MIN: CPT | Mod: S$GLB,,, | Performed by: STUDENT IN AN ORGANIZED HEALTH CARE EDUCATION/TRAINING PROGRAM

## 2025-09-04 PROCEDURE — 1126F AMNT PAIN NOTED NONE PRSNT: CPT | Mod: CPTII,S$GLB,, | Performed by: STUDENT IN AN ORGANIZED HEALTH CARE EDUCATION/TRAINING PROGRAM

## 2025-09-04 PROCEDURE — 1159F MED LIST DOCD IN RCRD: CPT | Mod: CPTII,S$GLB,, | Performed by: STUDENT IN AN ORGANIZED HEALTH CARE EDUCATION/TRAINING PROGRAM

## 2025-09-04 PROCEDURE — G2211 COMPLEX E/M VISIT ADD ON: HCPCS | Mod: S$GLB,,, | Performed by: STUDENT IN AN ORGANIZED HEALTH CARE EDUCATION/TRAINING PROGRAM

## 2025-09-04 RX ORDER — ISOPROPYL ALCOHOL 70 ML/100ML
1 SWAB TOPICAL 2 TIMES DAILY
Qty: 100 EACH | Refills: 3 | Status: SHIPPED | OUTPATIENT
Start: 2025-09-04

## 2025-09-04 RX ORDER — DULOXETIN HYDROCHLORIDE 60 MG/1
60 CAPSULE, DELAYED RELEASE ORAL DAILY
Qty: 30 CAPSULE | Refills: 11 | Status: SHIPPED | OUTPATIENT
Start: 2025-09-04 | End: 2026-09-04

## 2025-09-04 RX ORDER — BLOOD-GLUCOSE METER
1 EACH MISCELLANEOUS 2 TIMES DAILY
Qty: 100 EACH | Refills: 3 | Status: SHIPPED | OUTPATIENT
Start: 2025-09-04

## 2025-09-04 RX ORDER — DEXTROSE 4 G
1 TABLET,CHEWABLE ORAL DAILY
Qty: 100 EACH | Refills: 0 | Status: SHIPPED | OUTPATIENT
Start: 2025-09-04